# Patient Record
Sex: FEMALE | Race: OTHER | Employment: PART TIME | URBAN - METROPOLITAN AREA
[De-identification: names, ages, dates, MRNs, and addresses within clinical notes are randomized per-mention and may not be internally consistent; named-entity substitution may affect disease eponyms.]

---

## 2021-09-24 ENCOUNTER — OFFICE VISIT (OUTPATIENT)
Dept: SURGERY | Age: 36
End: 2021-09-24

## 2021-09-24 VITALS
OXYGEN SATURATION: 100 % | DIASTOLIC BLOOD PRESSURE: 73 MMHG | HEIGHT: 64 IN | WEIGHT: 293 LBS | HEART RATE: 60 BPM | TEMPERATURE: 97.7 F | SYSTOLIC BLOOD PRESSURE: 149 MMHG | BODY MASS INDEX: 50.02 KG/M2

## 2021-09-24 DIAGNOSIS — G47.30 SLEEP DISORDER BREATHING: Primary | ICD-10-CM

## 2021-09-24 PROCEDURE — 99205 OFFICE O/P NEW HI 60 MIN: CPT | Performed by: SPECIALIST

## 2021-09-24 RX ORDER — PRAVASTATIN SODIUM 20 MG/1
TABLET ORAL
COMMUNITY
Start: 2021-08-25

## 2021-09-24 RX ORDER — AMLODIPINE BESYLATE 10 MG/1
TABLET ORAL
COMMUNITY
Start: 2021-08-23

## 2021-09-24 RX ORDER — SERTRALINE HYDROCHLORIDE 25 MG/1
TABLET, FILM COATED ORAL
COMMUNITY
Start: 2021-07-13

## 2021-09-24 RX ORDER — ERGOCALCIFEROL 1.25 MG/1
CAPSULE ORAL
COMMUNITY
Start: 2021-08-25

## 2021-09-24 NOTE — PROGRESS NOTES
Bariatric Surgery Consultation    Subjective: The patient is a 39 y.o. obese female with a Body mass index is 64.68 kg/m². .  The patient is at her heaviest weight for the past 20 years. she has been overweight since a child. she has been considering surgery since this year. she desires surgery at this time because of multiple health concerns and their lifestyle issues which are hindered by their weight. she has been referred by his family physician Dr Alexander Varghese for evaluation and treatment of their obesity via surgical intervention. Viktoriya Fish has tried multiple diets in her lifetime   most recently tried physician supervised, behavior modification, unsupervised diets and Atkins    Bariatric comorbidities present are   Patient Active Problem List   Diagnosis Code    Morbid obesity ([facility] [address] E66.01    Morbid obesity with BMI of 60.0-69.9, adult ([facility] [address] E66.01, Z68.44    Gestational diabetes O24.419    Hypertension I10    Sleep disorder breathing G47.30    Arthritic-like pain M25.50    Hypercholesterolemia E78.00       The patient is considering laparoscopic gastric bypass surgery for surgical weight loss due to their ineffective progress with medical forms of weight loss and the urging of their physicians   who care for their primary medical issues. The patient  now presents  for consideration for weight loss surgery understanding the benefits of this over a medical approach of weight loss as   was discussed in our presentation on weight loss surgery. They have discussed their plans both with their family and primary care physician who is in support of their pursuit of such. The patient   has not had health issues as of late and denies and gastrointestinal disturbances other than what is outlined below in their review of symptoms.  All of their prior evaluations available by both their   PCP's and specialists physicians have been reviewed today either in the Care Everywhere portal or scanned under the media tab. I have spent a large portion of my initial consultation today reviewing the patients current dietary habits which have contributed to their health issues and obesity. I have suggested to them personally a dietary regimen that they can initiate now to help with their status as it pertains to their weight. They understand that the most important aspect of their   journey through their weight loss endeavor will be their adherence to a new lifestyle of healthy eating behavior. They also understand that an adherence to an exercise program will not only   help with weight loss but is ultimately important in weight maintenance. The patients goal weight is 195lb. These goals are consistent with expected outcomes of their desired operation. her Medical goals are resolution of these health issues.       Patient Active Problem List    Diagnosis Date Noted    Morbid obesity ([facility] [address]     Morbid obesity with BMI of 60.0-69.9, adult ([facility] [address]     Gestational diabetes     Hypertension     Sleep disorder breathing     Arthritic-like pain     Hypercholesterolemia       Past Surgical History:   Procedure Laterality Date    HX GYN      c section x 4      Social History     Tobacco Use    Smoking status: Never Smoker    Smokeless tobacco: Never Used   Substance Use Topics    Alcohol use: Yes     Comment: daily seltzer      Family History   Problem Relation Age of Onset    Hypertension Mother     Hypertension Father     Elevated Lipids Father       Current Outpatient Medications   Medication Sig Dispense Refill    ergocalciferol (ERGOCALCIFEROL) 1,250 mcg (50,000 unit) capsule       amLODIPine (NORVASC) 10 mg tablet       sertraline (ZOLOFT) 25 mg tablet       pravastatin (PRAVACHOL) 20 mg tablet        No Known Allergies       Review of Systems:            General - No history or complaints of unexpected fever, chills, or weight loss  Head/Neck - No history or complaints of headache, diplopia, dysphagia, hearing loss  Cardiac - No history or complaints of chest pain, palpitations, murmur, or shortness of breath  Pulmonary - No history or complaints of shortness of breath, productive cough, hemoptysis  Gastrointestinal - minimal reflux ,no  abdominal pain, obstipation/constipation or blood per rectum  Genitourinary - No history or complaints of hematuria/dysuria, stress urinary incontinence symptoms, or renal lithiasis  Musculoskeletal - moderate joint pain,  no muscular weakness  Hematologic - No history or complaints of bleeding disorders,  No blood transfusions  Neurologic - No history or complaints of  migraine headaches, seizure activity, syncopal episodes, TIA or stroke  Integumentary - No history or complaints of rashes, abnormal nevi, skin cancer  Gynecological - no menses           Objective:     Visit Vitals  BP (!) 149/73   Pulse 60   Temp 97.7 °F (36.5 °C)   Ht 5' 4\" (1.626 m)   Wt (!) 170.9 kg (376 lb 12.8 oz)   SpO2 100%   BMI 64.68 kg/m²       Physical Examination: General appearance - alert, well appearing, and in no distress and oriented to person, place, and time  Mental status - alert, oriented to person, place, and time, normal mood, behavior, speech, dress, motor activity, and thought processes  Eyes - pupils equal and reactive, extraocular eye movements intact, sclera anicteric, left eye normal, right eye normal  Ears - right ear normal, left ear normal  Nose - normal and patent, no erythema, discharge or polyps  Mouth - mucous membranes moist, pharynx normal without lesions  Neck - supple, no significant adenopathy  Lymphatics - no palpable lymphadenopathy, no hepatosplenomegaly  Chest - clear to auscultation, no wheezes, rales or rhonchi, symmetric air entry  Heart - normal rate, regular rhythm, normal S1, S2, no murmurs, rubs, clicks or gallops  Abdomen - soft, nontender, nondistended, no masses or organomegaly , incisional hernia noted just above umbilicus.   Back exam - full range of motion, no tenderness, palpable spasm or pain on motion  Neurological - alert, oriented, normal speech, no focal findings or movement disorder noted  Musculoskeletal - no joint tenderness, deformity or swelling  Extremities - peripheral pulses normal, no pedal edema, no clubbing or cyanosis  Skin - normal coloration and turgor, no rashes, no suspicious skin lesions noted    Labs:     No results found for: WBC, WBCLT, HGBPOC, HGB, HGBP, HCTPOC, HCT, PHCT, RBCH, PLT, MCV, HGBEXT, HCTEXT, PLTEXT  No results found for: NA, K, CL, CO2, AGAP, GLU, BUN, CREA, BUCR, GFRAA, GFRNA, CA, TBIL, TBILI, AP, TP, ALB, GLOB, AGRAT, ALT  No results found for: IRON, FE, TIBC, IBCT, PSAT, FERR  No results found for: FOL, RBCF  No results found for: VITD3, XQVID2, XQVID3, XQVID, VD3RIA              Assessment:     Morbid obesity with associated comorbidity    Plan:     laparoscopic gastric bypass surgery    This is a 39 y.o. female with a BMI of Body mass index is 64.68 kg/m². and the weight-related co-morbidties . Joyce meets the NIH criteria for bariatric surgery based upon the BMI of Body mass index is 64.68 kg/m². and   multiple weight-related co-morbidties. Joyce has elected laparoscopic gastric bypass as her intervention of choice for treatment of morbid obestiy through surgical means secondary   to its uniform results,  profound baseline suppression of hunger and pace at which weight is lost.    In the office today, following Joyce's history and physical examination, a 30 minute discussion regarding the anatomic alterations for the laparoscopic gastric bypass  was undertaken. The dietary expectations and the patient  dependent factors for success were thoroughly discussed, to include the need for interval follow-up and long-term dietary changes associated with success.    The possible short and long term  complications of the gastric bypass were also discussed, to include but not limited to;death, DVT/PE, staple line leak, bleeding, stricture formation, infection,internal   hernia  and pouch dilation. Specific weight related outcomes for success were also discussed with an emphasis on careful and close follow-up with the first year and dietary behavior modification over   the first years as baseline cyclical hunger returns  The patient expressed an understanding of the above factors, and her questions were answered in their entirety. In addition, the patient watched a seminar regarding obesity, surgical weight loss including, adjustable gastric band, gastric bypass, and sleeve gastrectomy. This discussion contrasted the different surgical techniques, mechanisms of actions and expected outcomes, and surgical and medical risks associated with each procedure. In office today we had a long question and answer session where each questions was answered until there were no additional questions. Today, the patient had all of her questions answered and desires to proceed with  bariatric surgery initially choosing the gastric bypass as her surgical option. Secondary Diagnoses:     Dietary Intervention  - The patient is currently scheduled to see or has been followed by a bariatric nutritionist for an attempt at preoperative weight loss as has been dictated by their insurance carrier. They will be assessed at various times during their follow up to evaluate their progress depending on the length of time that is required once again by their carrier. I have explained the importance of   preoperative weight loss and the benefits regarding lower surgical risk and also assisting the patient in reaching their weight loss goal. They understand also that they should participate in an  exercise program to assist in this weight loss. Finally they understand there is a physiologic benefit from the standpoint of hepatic volume reduction and reduction of central visceral adiposity   preoperatively.   I have reiterated the importance of a low carbohydrate and high protein regimen to achieve their   stated goal. I have reviewed their current eating behavior prior to this encounter and explained to them in an exhaustive fashion the appropriate diet that they should adhere to. They have been   encouraged to loose weight pre operatively and understand it is our prerogative to cancel surgery or postpone their procedure in the event of significant weight gain. The patients weight loss goal pre operatively is 20 pounds. Hypertension - The patient has a clear understanding of how weight loss improves hypertension as a whole, but also they understand that there is a significant genetic component   to this disease process. We will monitor the patients blood pressure while in the hospital and the plan would be to continue those medications postoperatively. If a diuretic is being   used we will stop them on discharge to prevent dehydration particularly with the sleeve gastrectomy and the gastric bypass procedures. They will be instructed to monitor their blood   pressure postoperatively while at home and notify their primary care physician in the event of any significantly high or uncharacteristic readings. They understand that surgery may be  cancelled if their blood pressure is deemed out of control the day of surgery either by myself or the anesthesia department. For this reason they must take their medications as directed   and monitor their blood pressure regularly working up until their surgical date. Hyperlipidemia - The patient understands that studies show that almost all patient will realize an improvement in their lipid profile with weight loss that occurs with these procedures. They however also understand that hyperlipidemia is a multifactorial disease particularly as it pertains to their genetic background and that there is no guarantee toward cure  of this   issue.  We will resume their medications both pre operatively and immediately postoperatively as this tends to decrease any post operative cardiac events. The patient will follow up   with their family physician in the postoperative period with plans to repeat their lipid panel 2-3 month postoperative for potential adjustment or removal of these medications. Obstructive Sleep Apnea -The patient understands the association of sleep apnea and obesity and the additional risk that it caries related to post surgical complications. If they have not been tested for sleep apnea and I feel they are at increased risk for this diagnosis, then they will be scheduled for a consultation with a Pulmonologist for such. In the event that they suzie this diagnosis we will have the patient bring their CPAP machine to the hospital for use both postoperatively in the PACU and on the floor at its appropriate setting.  We will have them continue using it while at home after surgery and follow up with their pulmonologist 6 months after to be retested to see if it can be discontinued at that time period. Will send for sleep study.         Signed By: Alejandro Villa MD     September 24, 2021

## 2021-12-22 ENCOUNTER — APPOINTMENT (OUTPATIENT)
Dept: SURGERY | Age: 36
End: 2021-12-22

## 2021-12-22 ENCOUNTER — HOSPITAL ENCOUNTER (OUTPATIENT)
Age: 36
Setting detail: OUTPATIENT SURGERY
Discharge: HOME | End: 2021-12-22
Attending: SPECIALIST | Admitting: SPECIALIST
Payer: MEDICAID

## 2021-12-22 ENCOUNTER — APPOINTMENT (OUTPATIENT)
Dept: GENERAL RADIOLOGY | Age: 36
End: 2021-12-22
Attending: SPECIALIST
Payer: MEDICAID

## 2021-12-22 ENCOUNTER — OFFICE VISIT (OUTPATIENT)
Dept: SURGERY | Age: 36
End: 2021-12-22

## 2021-12-22 VITALS
DIASTOLIC BLOOD PRESSURE: 93 MMHG | BODY MASS INDEX: 51.91 KG/M2 | HEIGHT: 63 IN | TEMPERATURE: 98 F | OXYGEN SATURATION: 100 % | RESPIRATION RATE: 22 BRPM | HEART RATE: 58 BPM | SYSTOLIC BLOOD PRESSURE: 160 MMHG | WEIGHT: 293 LBS

## 2021-12-22 DIAGNOSIS — E66.01 MORBID OBESITY (HCC): Primary | ICD-10-CM

## 2021-12-22 DIAGNOSIS — K21.9 GASTROESOPHAGEAL REFLUX DISEASE WITHOUT ESOPHAGITIS: ICD-10-CM

## 2021-12-22 DIAGNOSIS — E66.01 MORBID OBESITY (HCC): ICD-10-CM

## 2021-12-22 PROCEDURE — 74240 X-RAY XM UPR GI TRC 1CNTRST: CPT | Performed by: SPECIALIST

## 2021-12-22 PROCEDURE — 74240 X-RAY XM UPR GI TRC 1CNTRST: CPT

## 2021-12-22 PROCEDURE — 76040000019: Performed by: SPECIALIST

## 2021-12-22 PROCEDURE — 74011000250 HC RX REV CODE- 250: Performed by: SPECIALIST

## 2021-12-22 NOTE — PROCEDURES
Patient:Latrice Jearlean Cogan   : 1985  Medical Record QRLBTE:[id number]            PREPROCEDURE DIAGNOSIS: This patient is preoperative for laparoscopic gastric bypass surgery procedure with a history of  reflux disease. POSTPROCEDURE DIAGNOSIS: This patient is preoperative for laparoscopic gastric bypass surgery procedure with a history of  reflux disease. PROCEDURES PERFORMED: Upper GI study with barium. ESTIMATED BLOOD LOSS: None. SPECIMENS: None. STATEMENT OF MEDICAL NECESSITY: The patient is a patient with a  longstanding history of obesity. They are now considering the laparoscopic gastric bypass surgery procedure as a means of surgical weight control and due to their history of reflux disease and are being assessed preoperatively for such. DESCRIPTION OF PROCEDURE: The patient was brought to the fluoroscopy unit and  was given thin barium. On swallowing of barium, they were noted to have  normal peristalsis of their esophagus. They had prompt filling of distal  esophagus with tapering into the gastroesophageal junction. There was no evidence of a hiatal hernia present. Contrast then filled the gastric cardia, fundus,body and pre pyloric region with no abnormalities noted. Contrast then exited the pylorus in normal fashion. No obstruction was noted. There was no evidence of reflux noted.     (normal anatomy)    Brown Cho MD

## 2021-12-30 VITALS — WEIGHT: 293 LBS | HEIGHT: 64 IN | BODY MASS INDEX: 50.02 KG/M2

## 2021-12-31 NOTE — PROGRESS NOTES
[facility]  Multidisciplinary Weight Loss    Date: 2021    Name: Margot Rabago   : 1985    Session# 1. Pt attended a 90 minute in-person class. Topics reviewed include pre-op key diet principles, behavior modification techniques, and physical activity/exercise guidelines both pre- and post-operatively. Pt completed a bariatric-specific questionnaire, including a diet history, physical activity summary, and responses to additional nutrition-related questions. Pt submitted bariatric quiz, which requires at least an 80% pass-rate for surgical approval from RD. Weight obtained in office. Visit Vitals  Ht 5' 4\" (1.626 m)   Wt (!) 176.2 kg (388 lb 6.4 oz)   BMI 66.67 kg/m²       Pt has GAINED 11.6 lbs since initial consult on 21. Pt has a pre-op weight loss goal of 20 lbs set by the provider. Dietary Instructions     Reviewed intake   Understanding low carbohydrates, low sugar, higher protein meals   Instruction given for personal dietary changes   Discussed perceived compliance    Comments:  Recommended dietary changes discussed for both before and after surgery. Recommendation to follow 1200 calorie diet, working to reduce total carbohydrate intake to  g or less per day and increasing protein intake to  g per day, with no liquids at mealtimes (for preparation of post-op 30:30 rule recommendation), and 64+ oz  no sugar, no caffeine, no carbonation fluids per day. Discussed importance of sampling protein supplements, protein supplement label guidelines and popularly selected items. Educated pt on the importance of eating 3 meals/day at regularly scheduled times, including breakfast within the first 1-2 hours of waking, as well as working towards consuming 4-6 smaller meals daily to assist with optimizing protein intake and absorption.  Suggested patient use a protein supplement as a meal replacement, instead of skipping meals, OR as a high-protein snack between meals. Also provided education on the importance of including a protein source with every meal and snack, and reviewed lean sources of proteins. Introduced the Plate Method for meal planning and discussed the importance of reducing and limiting daily carbohydrate intake. Recommended carbohydrate intake amounts are  g/d now with an ultimate goal of 30-50 g/d pre-operatively and a very low carbohydrate content post-operatively. Discussed label reading, tips for measuring carbohydrates, and low carbohydrate- high protein meal and snack ideas. Behavior Modification     Identify obstacles to trigger change   Achieving/Rewarding goals met   Positive attitude   Reinforced the importance of modifying lifestyle patterns and behaviors to promote weight loss and long-term weight maintenance    Comments:   Discussed key diet principles. Reviewed dietary and behavior changes to begin making now. Discussed the importance of taking vitamin/mineral supplements post-op, and we reviewed the vitamins/minerals that patients will be taking post-op. Encouraged pt to begin taking multivitamin and probiotic now in preparation for surgery. RD reviewed questionnaire responses and provided positive feedback on responses that align with bariatric recommendations for behavior changes. Also provided feedback on areas for improvement. All current stated pt questions answered. Physical Activity/Exercise     Motivation   Discussed Perceived Compliance    Comments:  Pt has been educated on the importance of starting a physical activity regimen. Reinforced the importance of engaging in regular physical activity for weight management and overall health. Recommend pt adopt an exercise routine of at least 3-5 d/wk for at least 30 min/d.   If pt is unable to participate in moderate intensity exercises such as walking, theresa, or swimming, recommend pt work with a physical therapist and/or participate in chair exercises, yoga, and/or increase activities of daily living as able. Encouraged pt to set and keep weekly exercise goals. Goals:   1. Work to increase to 3-4 small meals per day, with planned snacks as needed. Recommend following plate method for meal planning - focusing on lean protein, non-starchy vegetables, and measured amounts of starch. - Goal of  g protein and  g carbohydrate per day. - Recommend continuing protein supplement as meal replacement at least 1x/day OR as high protein snack option  2. Increase non caloric fluid to 64 oz per day. Eliminate caffeine, added sugar, carbonation, and straws.               -Continue to work to decrease sugar sweetened beverages - goal of calorie free beverages only              -Must eliminate caffeine prior to surgery and avoid for ~6-8 weeks   -Practice 30:30 rule,  food and fluid intake   3. Start activity regimen, work to increase ADL  4. Start Complete MVI    Candidate for surgery (per RD): Patient has met insurance requirements; however, I recommend that they return to clinic in 1 month for weight check since pt has gained 11.6 lbs since their initial consult on 9/24/21. This will give pt time to put the nutritional goals set today for weight loss into practice. Pt acknowledges understanding that bariatric surgery requires lifelong adherence to dietary and exercise behavior change recommendations in order to be successful with weight loss and weight-loss maintenance. Pt demonstrates understanding of post-op key diet principles and recommendations through class discussion and recall. Handouts provided:    Bariatric Surgery Criteria\" Packet   Overview of Vitamins & Minerals     Post-Op Education\" Packet   Meal Guide Packet   BD Starches Guide   Nuts for Nuts? Be Careful!    Protein Chart   Protein Content of Foods   Suzanne Products Counting\"   \"Snack Suggestions\"    RD contact information provided for future nutrition questions or concerns. [x] E-mail sent to pt with monthly support group information.     Shelly Nuñez RD

## 2022-02-02 ENCOUNTER — CLINICAL SUPPORT (OUTPATIENT)
Dept: SURGERY | Age: 37
End: 2022-02-02

## 2022-02-02 VITALS — BODY MASS INDEX: 51.91 KG/M2 | HEIGHT: 63 IN | WEIGHT: 293 LBS

## 2022-02-02 DIAGNOSIS — E66.01 MORBID OBESITY (HCC): Primary | ICD-10-CM

## 2022-02-02 NOTE — PROGRESS NOTES
Patient's Name: Zabrina Delaney   Age: 39 y.o. YOB: 1985   Sex: female    Date:  2/2/2022      Visit 2      Visit Vitals  Ht 5' 3\" (1.6 m)   Wt (!) 174.9 kg (385 lb 8 oz)   BMI 68.29 kg/m²       Pounds Lost since last month: 2.9 lbs. Pounds Gained since last month: 0 lbs. Starting Weight: 376.8 lbs. Previous Months Weight: 388.4 lbs. Overall Pounds Lost: 0 lbs. Overall Pounds Gained: 8.7 lbs. Do you smoke? NO    Alcohol intake? YES  Number of drinks at a time:  16 oz cocktail  Number of times a week: 4-5d/wk      Eating Habits and Behaviors    I reviewed Nutrition, Behavior, and Exercise changes to start working on. Some of the eating behaviors that we discussed included:  timing of meals, meal composition, macronutrient composition of various foods, the importance of adequate fluids and protein, and selecting appropriate foods for meals and snacks. We also talked about avoiding liquid calories, including alcohol. Patient is encouraged eliminate alcohol consumption and aim for 64 ounces of sugar-free, caffeine-free, and carbonation-free fluid per day, preferably from water, but also sugar-free beverages such as Crystal Light. Additionally, we discussed healthier options for dining out. Patient was encouraged to always request sauces and dressings on the side and request a salad, broth-based soups, or non-starchy vegetables in place of fries.       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; reports she is not consistent with fluid intake: drinks more when not at work; set a family rule of \"no takeout\" for dinner during last 2 weeks but often eats fast food when working dayshift  5:30-6:00 AM - UP (6x/2wk)  7 am-3:30 pm - BREAKFAST: [employer] (breakfast meal - croissant w/ breaded chicken, cheese, powell and hash fries, gerald lemonade no ice) OR snacks throughout 8 hr shift on nuts, granola bars, PB/cheese crackers  1:00 pm - LUNCH: \"whatever I find in the house\" - leftovers OR TV dinner (john steak, mashed potatoes)  6:00-7:00 PM - DINNER: turkey wings, rice OR stir leung (onion, peppers, steak) w/ rice OR breaded chicken in general La's sauce w/ fettuccine, broccoli  DESSERT: sherbet (2-3x/wk)  SNACKS: cheese and crackers, mangos, lettuce wrap w/ tuna fish or chicken salad, broccoli w/ ranch dip, cucumbers in vinegar  FLUIDS: water (5-6 bottles/d if not at work or 4 bottles when working + >32 oz jug of water w/ Crystal light at work)    Protein supplement intake: drinks Boost at work    Physical Activity/Exercise    Comments: We talked about exercise. Patient was given reasons of why exercise is so important and how that can help with their long-term success. Discussed goal of 150 minutes of vigorous activity/wk and to include purposeful activity, such as parking far in the grocery store lot, etc. I have encouraged patient to get a support system to help with the activity. Currently for activity, patient is: has membership at [employer], not currently using; also has stationary bike at home      Behavior Modification       Comments: We also talked about behavior modifications. We talked about eating triggers, such as eating in front of the TV and solutions, such as making the TV a no eating zone. If patient is eating out of emotion, food will only temporarily solve that. Patient is encouraged to HALT and assess if they are eating because they are Hungry, or out of emotions: Anxious, Lonely, Tired, which the food will only temporarily solve. We also talked about ways to prevent relapse. Goals that patient wants to work on include:  1. Eat first protein-forward meal within 2 hours of waking and eat a meal or high protein snack every 3-4 hrs while awake.   2.  Patient will find 2 protein supplements from approved list that they will drink post-operatively and use as a meal replacement or high protein snack up to 2x/d to facilitate pre-op weight loss. 3.  Patient will reduce carbohydrate intake to <100 g/d in the next month  4. Patient will eliminate alcohol intake and consumption of sugary beverages by next appointment, ensuring intake of 64 oz water or other sugar-free/carbonation-free/caffeine-free fluids daily  5. Patient willwork on sipping fluids between meals rather than \"chugging\" large quantities of water at meals  6. Aim for 90 min/wk of moderate physical activity with ultimate goal of 150 min/wk physical activity by the next appt. Handouts Provided:     [] Bariatric Surgery Criteria Packet  [] \"Nut for Nuts? Be careful! \"  [] Meal Guide Handout  [] Carb Counting  [] Protein content of Foods  [] Snack Suggestions  [] BD Starches Packet  [] Protein Chart  [] Post-op Education Packet  [] Overview of Vitamins & Minerals TIMELINE  [x] Other \"Preparing for Weight Loss Surgery:  <100 g/d Carbohydrate Eating Plan\"; \"Meal Goals & Guidelines\"; \"Protein Supplement\" List  [] None    Follow-up: Pt scheduled for nutrition education on 3/2/22. Pt was given RD contact information for nutrition-related questions. All current questions answered. Alex Holden RD  2/2/2022                              . Inga Archer

## 2024-09-04 ENCOUNTER — TELEMEDICINE (OUTPATIENT)
Age: 39
End: 2024-09-04
Payer: MEDICAID

## 2024-09-04 DIAGNOSIS — M25.50 ARTHRALGIA, UNSPECIFIED JOINT: ICD-10-CM

## 2024-09-04 DIAGNOSIS — E66.01 MORBID OBESITY (HCC): ICD-10-CM

## 2024-09-04 DIAGNOSIS — K43.2 INCISIONAL HERNIA, WITHOUT OBSTRUCTION OR GANGRENE: ICD-10-CM

## 2024-09-04 DIAGNOSIS — E66.01 MORBID OBESITY WITH BMI OF 60.0-69.9, ADULT (HCC): ICD-10-CM

## 2024-09-04 DIAGNOSIS — I10 HYPERTENSION, UNSPECIFIED TYPE: Primary | ICD-10-CM

## 2024-09-04 DIAGNOSIS — E78.00 HYPERCHOLESTEROLEMIA: ICD-10-CM

## 2024-09-04 PROCEDURE — 99205 OFFICE O/P NEW HI 60 MIN: CPT | Performed by: SPECIALIST

## 2024-09-04 RX ORDER — PHENTERMINE HYDROCHLORIDE 37.5 MG/1
37.5 TABLET ORAL
COMMUNITY

## 2024-09-04 RX ORDER — SPIRONOLACTONE 50 MG/1
50 TABLET, FILM COATED ORAL DAILY
COMMUNITY

## 2024-09-04 RX ORDER — DROSPIRENONE AND ETHINYL ESTRADIOL 0.02-3(28)
1 KIT ORAL DAILY
COMMUNITY

## 2024-09-04 RX ORDER — BUPROPION HYDROCHLORIDE 300 MG/1
300 TABLET ORAL EVERY MORNING
COMMUNITY

## 2024-09-04 NOTE — PROGRESS NOTES
contrasted the different surgical techniques, mechanisms of actions and expected outcomes, and surgical and medical risks associated with each procedure.     Today, the patient had all of her questions answered and desires to proceed with  bariatric surgery initially choosing the gastric bypass as her surgical option.    Secondary Diagnoses:     Dietary Intervention  - The patient is currently scheduled to see or has been followed by a bariatric nutritionist for an attempt at preoperative weight loss as has been dictated by their insurance carrier.  They will be assessed at various times during their follow up to evaluate their progress depending on the length of time that is required once again by their carrier.  I have explained the importance of preoperative weight loss and the benefits regarding lower surgical risk and also assisting the patient in reaching their weight loss goal.  Finally they understand their is a physiologic benefit from the standpoint of hepatic volume reduction preoperatively.  I have reiterated the importance of a low carbohydrate and high protein regimen to achieve their stated goal.       Hypertension - the patient understands, and we have discussed in detail, that this is an active acute health problem that has a multifactorial effect on their body from the standpoint of healing and complications related to surgery.  They understand that uncontrolled hypertension affects other organ processes and this leads to risk associated with surgical procedures.  They understand that in addition to hypertension, once they develop other health issues, their risk is exponentially worse.  Currently they understand that this likely one of the single most important items that they need to control in the perioperative process.  They understand that the hospitals protocol is that patients entering the operative suite should have a blood pressure reading less than 160/90 prior to surgery.  Elevated

## 2024-10-04 ENCOUNTER — OFFICE VISIT (OUTPATIENT)
Age: 39
End: 2024-10-04
Payer: MEDICAID

## 2024-10-04 VITALS
RESPIRATION RATE: 16 BRPM | OXYGEN SATURATION: 100 % | WEIGHT: 293 LBS | TEMPERATURE: 98.3 F | DIASTOLIC BLOOD PRESSURE: 87 MMHG | HEIGHT: 65 IN | HEART RATE: 67 BPM | SYSTOLIC BLOOD PRESSURE: 132 MMHG | BODY MASS INDEX: 48.82 KG/M2

## 2024-10-04 DIAGNOSIS — E28.2 PCOS (POLYCYSTIC OVARIAN SYNDROME): ICD-10-CM

## 2024-10-04 DIAGNOSIS — E66.01 MORBID OBESITY WITH BODY MASS INDEX (BMI) OF 50.0 TO 59.9 IN ADULT: ICD-10-CM

## 2024-10-04 DIAGNOSIS — I10 HYPERTENSION, UNSPECIFIED TYPE: ICD-10-CM

## 2024-10-04 DIAGNOSIS — E78.00 HYPERCHOLESTEROLEMIA: ICD-10-CM

## 2024-10-04 DIAGNOSIS — M25.50 ARTHRALGIA, UNSPECIFIED JOINT: ICD-10-CM

## 2024-10-04 DIAGNOSIS — K43.2 INCISIONAL HERNIA, WITHOUT OBSTRUCTION OR GANGRENE: ICD-10-CM

## 2024-10-04 DIAGNOSIS — F41.9 ANXIETY: ICD-10-CM

## 2024-10-04 DIAGNOSIS — G47.30 SLEEP DISORDER BREATHING: ICD-10-CM

## 2024-10-04 DIAGNOSIS — E66.01 MORBID OBESITY: ICD-10-CM

## 2024-10-04 DIAGNOSIS — E66.01 MORBID OBESITY WITH BODY MASS INDEX (BMI) OF 50.0 TO 59.9 IN ADULT: Primary | ICD-10-CM

## 2024-10-04 DIAGNOSIS — F32.A DEPRESSION, UNSPECIFIED DEPRESSION TYPE: ICD-10-CM

## 2024-10-04 DIAGNOSIS — Z01.818 PRE-OP TESTING: ICD-10-CM

## 2024-10-04 PROCEDURE — 99214 OFFICE O/P EST MOD 30 MIN: CPT | Performed by: SPECIALIST

## 2024-10-04 PROCEDURE — 3075F SYST BP GE 130 - 139MM HG: CPT | Performed by: SPECIALIST

## 2024-10-04 PROCEDURE — 3079F DIAST BP 80-89 MM HG: CPT | Performed by: SPECIALIST

## 2024-10-04 NOTE — PROGRESS NOTES
extraocular eye movements intact  Ears - external ear canals normal  Nose - normal and patent, no erythema, discharge or polyps  Mouth - mucous membranes moist, pharynx normal without lesions  Neck - supple, no significant adenopathy  Lymphatics - no palpable lymphadenopathy, no hepatosplenomegaly  Chest - clear to auscultation, no wheezes, rales or rhonchi, symmetric air entry  Heart - normal rate, regular rhythm, normal S1, S2, no murmurs, rubs, clicks or gallops  Abdomen - chronically incarcerated periumbilical hernia at vertical midline incision  Back exam - full range of motion, no tenderness, palpable spasm or pain on motion  Neurological - alert, oriented, normal speech, no focal findings or movement disorder noted  Musculoskeletal - no joint tenderness, deformity or swelling  Extremities - peripheral pulses normal, no pedal edema, no clubbing or cyanosis  Skin - normal coloration and turgor, no rashes, no suspicious skin lesions noted    Labs:             Assessment:     Morbid obesity with associated comorbidity     Plan:     Continuation of Pre-Operative evaluation / clearance.    Lily Ortiz has returned to the office today to discuss her status as a surgical candidate.  her progress has been noted and reviewed.  We will continue the pre-operative process and work towards goals as outlined.    she has 15 more pounds to lose before proceeding to the OR.      Lily Ortiz understand the rationales for all the above.  It has been discussed that given her chronically incarcerated hernia that the best surgical option for this patient would be the sleeve resection.  Lily Ortiz agrees with the surgical choice and has been educated in it's; risks, benefits, and alternatives.  We will continue with the pre-operative evaluation as needed to check progress.    The patient understands the plan of action    Dr. Dudley well aware of pt's exam, plan, and weight loss goal    Secondary Diagnoses:

## 2024-10-07 NOTE — PATIENT INSTRUCTIONS
increase your chances of quitting for good.  Limit alcohol to 2 drinks a day for men and 1 drink a day for women. Too much alcohol can cause health problems.  If you have a BMI higher than 25  Your doctor may do other tests to check your risk for weight-related health problems. This may include measuring the distance around your waist. A waist measurement of more than 40 inches in men or 35 inches in women can increase the risk of weight-related health problems.  Talk with your doctor about steps you can take to stay healthy or improve your health. You may need to make lifestyle changes to lose weight and stay healthy, such as changing your diet and getting regular exercise.  If you have a BMI lower than 18.5  Your doctor may do other tests to check your risk for health problems.  Talk with your doctor about steps you can take to stay healthy or improve your health. You may need to make lifestyle changes to gain or maintain weight and stay healthy, such as getting more healthy foods in your diet and doing exercises to build muscle.  Where can you learn more?  Go to http://[email or web address]net/[employer].  Enter S176 in the search box to learn more about \"Body Mass Index: Care Instructions.\"  Current as of: June 26, 2018  Content Version: 11.8  © 8013-3061 [employer]. Care instructions adapted under license by [employer] (which disclaims liability or warranty for this information). If you have questions about a medical condition or this instruction, always ask your healthcare professional. [employer] disclaims any warranty or liability for your use of this information.

## 2024-10-09 ENCOUNTER — HOSPITAL ENCOUNTER (OUTPATIENT)
Facility: HOSPITAL | Age: 39
Setting detail: SPECIMEN
Discharge: HOME | End: 2024-10-12

## 2024-10-09 LAB — LABCORP SPECIMEN COLLECTION: NORMAL

## 2024-10-09 PROCEDURE — 99001 SPECIMEN HANDLING PT-LAB: CPT

## 2024-10-10 LAB
ALBUMIN SERPL-MCNC: 4.5 G/DL (ref 3.9–4.9)
ALP SERPL-CCNC: 68 IU/L (ref 44–121)
ALT SERPL-CCNC: 18 IU/L (ref 0–32)
AST SERPL-CCNC: 19 IU/L (ref 0–40)
BASOPHILS # BLD AUTO: 0 X10E3/UL (ref 0–0.2)
BASOPHILS NFR BLD AUTO: 0 %
BILIRUB SERPL-MCNC: 0.7 MG/DL (ref 0–1.2)
BUN SERPL-MCNC: 14 MG/DL (ref 6–20)
BUN/CREAT SERPL: 14 (ref 9–23)
CALCIUM SERPL-MCNC: 10 MG/DL (ref 8.7–10.2)
CHLORIDE SERPL-SCNC: 101 MMOL/L (ref 96–106)
CO2 SERPL-SCNC: 20 MMOL/L (ref 20–29)
CREAT SERPL-MCNC: 1.03 MG/DL (ref 0.57–1)
EGFRCR SERPLBLD CKD-EPI 2021: 71 ML/MIN/1.73
EOSINOPHIL # BLD AUTO: 0.1 X10E3/UL (ref 0–0.4)
EOSINOPHIL NFR BLD AUTO: 1 %
ERYTHROCYTE [DISTWIDTH] IN BLOOD BY AUTOMATED COUNT: 12.5 % (ref 11.7–15.4)
FERRITIN SERPL-MCNC: 106 NG/ML (ref 15–150)
GLOBULIN SER CALC-MCNC: 3.4 G/DL (ref 1.5–4.5)
GLUCOSE SERPL-MCNC: 85 MG/DL (ref 70–99)
HCT VFR BLD AUTO: 41.8 % (ref 34–46.6)
HGB BLD-MCNC: 13.8 G/DL (ref 11.1–15.9)
IMM GRANULOCYTES # BLD AUTO: 0 X10E3/UL (ref 0–0.1)
IMM GRANULOCYTES NFR BLD AUTO: 0 %
IRON SERPL-MCNC: 66 UG/DL (ref 27–159)
LYMPHOCYTES # BLD AUTO: 3.2 X10E3/UL (ref 0.7–3.1)
LYMPHOCYTES NFR BLD AUTO: 26 %
MCH RBC QN AUTO: 29.4 PG (ref 26.6–33)
MCHC RBC AUTO-ENTMCNC: 33 G/DL (ref 31.5–35.7)
MCV RBC AUTO: 89 FL (ref 79–97)
MONOCYTES # BLD AUTO: 0.7 X10E3/UL (ref 0.1–0.9)
MONOCYTES NFR BLD AUTO: 5 %
NEUTROPHILS # BLD AUTO: 8.4 X10E3/UL (ref 1.4–7)
NEUTROPHILS NFR BLD AUTO: 68 %
PLATELET # BLD AUTO: 261 X10E3/UL (ref 150–450)
POTASSIUM SERPL-SCNC: 4.2 MMOL/L (ref 3.5–5.2)
PROT SERPL-MCNC: 7.9 G/DL (ref 6–8.5)
RBC # BLD AUTO: 4.69 X10E6/UL (ref 3.77–5.28)
SODIUM SERPL-SCNC: 138 MMOL/L (ref 134–144)
T4 FREE SERPL-MCNC: 1.43 NG/DL (ref 0.82–1.77)
TSH SERPL DL<=0.005 MIU/L-ACNC: 1.98 UIU/ML (ref 0.45–4.5)
WBC # BLD AUTO: 12.5 X10E3/UL (ref 3.4–10.8)

## 2024-10-23 ENCOUNTER — HOSPITAL ENCOUNTER (OUTPATIENT)
Facility: HOSPITAL | Age: 39
Discharge: HOME | End: 2024-10-26

## 2024-10-23 ENCOUNTER — HOSPITAL ENCOUNTER (OUTPATIENT)
Facility: HOSPITAL | Age: 39
Setting detail: OUTPATIENT SURGERY
Discharge: HOME | End: 2024-10-26
Payer: MEDICAID

## 2024-10-23 ENCOUNTER — HOSPITAL ENCOUNTER (OUTPATIENT)
Facility: HOSPITAL | Age: 39
Discharge: HOME | End: 2024-10-26
Payer: MEDICAID

## 2024-10-23 VITALS — BODY MASS INDEX: 51.91 KG/M2 | WEIGHT: 293 LBS | HEIGHT: 63 IN

## 2024-10-23 VITALS
WEIGHT: 293 LBS | TEMPERATURE: 97.8 F | BODY MASS INDEX: 48.82 KG/M2 | SYSTOLIC BLOOD PRESSURE: 152 MMHG | HEIGHT: 65 IN | HEART RATE: 67 BPM | RESPIRATION RATE: 18 BRPM | DIASTOLIC BLOOD PRESSURE: 91 MMHG

## 2024-10-23 DIAGNOSIS — E66.01 MORBID OBESITY: ICD-10-CM

## 2024-10-23 PROCEDURE — 74240 X-RAY XM UPR GI TRC 1CNTRST: CPT | Performed by: SPECIALIST

## 2024-10-23 PROCEDURE — 74220 X-RAY XM ESOPHAGUS 1CNTRST: CPT

## 2024-10-23 PROCEDURE — 2500000003 HC RX 250 WO HCPCS: Performed by: SPECIALIST

## 2024-10-23 PROCEDURE — 74240 X-RAY XM UPR GI TRC 1CNTRST: CPT

## 2024-10-23 RX ADMIN — BARIUM SULFATE 100 ML: 960 POWDER, FOR SUSPENSION ORAL at 15:45

## 2024-10-23 NOTE — PROGRESS NOTES
[facility] UGI FOCUS NOTE      Date:  10/23/2024  Time:  3:38 PM    Patient:  Lily Ortiz  Procedure:  UGI      Patient Questions  Lap Band Adjustment Patient Questionnaire:  If female, are you pregnant? []Yes     [x]No  Tolerates thick liquids:  []Well   [x]1     []2     []3     []4     []5     []Poorly  Tolerates red meat:  []Well   []1     [x]2     []3     []4     []5     [] Poorly  Tolerates chicken:  [x]Well   []1     []2     []3     []4     []5     []Poorly  Tolerates fish:   [x]Well   []1     []2     []3     []4     []5     []Poorly  Hunger is:   []Well Controlled     [x]1     []2     []3     []4     []5      [] Poorly Controlled  Nightime regurgitation:  [x]Never     []1     []2     []3     []4     []5     []Frequently    Lap Band Info:  Band Type  []Realize     []Realize-C     []AP     []VG     []10cm     []Unknown  []Other      Comments:        Jenni Middleton RN

## 2024-10-23 NOTE — PROCEDURES
Patient:Lily Ortiz   : 1985  Medical Record Number:[id number]            PREPROCEDURE DIAGNOSIS: This patient is preoperative for laparoscopic gastric bypass surgery procedure with a history of  reflux disease.    POSTPROCEDURE DIAGNOSIS: This patient is preoperative for laparoscopic gastric bypass surgery procedure with a history of  reflux disease.      PROCEDURES PERFORMED: Upper GI study with barium.    ESTIMATED BLOOD LOSS: None.    SPECIMENS: None.    STATEMENT OF MEDICAL NECESSITY: The patient is a patient with a  longstanding history of obesity. They are now considering the laparoscopic gastric bypass surgery procedure as a means of surgical weight control and due to their history of reflux disease and are being assessed preoperatively for such.    DESCRIPTION OF PROCEDURE: The patient was brought to the fluoroscopy unit and  was given thin barium. On swallowing of barium, they were noted to have  normal peristalsis of their esophagus. They had prompt filling of distal  esophagus with tapering into the gastroesophageal junction. There was no evidence of a hiatal hernia present. Contrast then filled the gastric cardia, fundus,body and pre pyloric region with no abnormalities noted. Contrast then exited the pylorus in normal fashion. No obstruction was noted. There was no evidence of reflux noted.    (normal anatomy)    Yang Dudley MD

## 2024-10-23 NOTE — PROGRESS NOTES
Medical Weight Loss Multi-Disciplinary Program    Patient's Name: Lily Ortiz Age: 39 y.o.   YOB: 1985 Sex: female      Session #1. Pt attended in-person class.  Weight obtained in office.    Date: 10/23/2024    Vitals:    10/23/24 1622   Weight: (!) 159.4 kg (351 lb 8 oz)   Height: 1.6 m (5' 3\")      Body mass index is 62.27 kg/m².     Pounds Lost since last month: N/A   Pounds Gained since last month: N/A    Starting Weight: 349.5 lbs  Previous Month’s Weight: N/A  Overall Pounds Lost: 0.0 lbs  Overall Pounds Gained: 2 lbs      Class Guidelines    Guidelines are reviewed with patient at the start of every class.    1. Patient understands that weight loss trial classes must be consecutive.  Patient understands if they miss a class, it is their responsibility to contact me to reschedule class.  I will reach out to patient after their first no show.  2.  Patient understands the expectations that weight maintenance/weight loss is expected during the classes.  Failure to demonstrate changes may result in extension of weight loss trial, followed by returning to see the surgeon.  Patient understands that they CANNOT gain any weight during the weight loss trial.  Gaining weight will result in extension of weight loss trial.  3. Patient is also instructed to complete their labwork, psychological evaluation visit, and any other tests that the surgeon has used while they are working on their weight loss trial.  4.  Patient was instructed to bring their packet of nutrition education materials to every class and appointment.        Eating Habits and Behaviors    Today in class we reviewed the Key Diet Principles.  Patient was encouraged to consume 3 meals each day, and the timing the meals was reviewed.  Meal time behaviors that will help pt to be successful with their weight loss efforts were additionally reviewed.      We discussed the importance of drinking adequate amounts of fluids, recommending that

## 2024-11-14 ENCOUNTER — PREP FOR PROCEDURE (OUTPATIENT)
Age: 39
End: 2024-11-14

## 2024-11-14 ENCOUNTER — HOSPITAL ENCOUNTER (OUTPATIENT)
Facility: HOSPITAL | Age: 39
Discharge: HOME | End: 2024-11-17

## 2024-11-14 VITALS — WEIGHT: 293 LBS | BODY MASS INDEX: 51.91 KG/M2 | HEIGHT: 63 IN

## 2024-11-14 DIAGNOSIS — E66.01 MORBID OBESITY: ICD-10-CM

## 2024-11-14 DIAGNOSIS — Z01.812 PRE-OPERATIVE LABORATORY EXAMINATION: ICD-10-CM

## 2024-11-14 DIAGNOSIS — E66.01 MORBID OBESITY WITH BODY MASS INDEX (BMI) OF 50.0 TO 59.9 IN ADULT: ICD-10-CM

## 2024-11-14 DIAGNOSIS — I10 HYPERTENSION, UNSPECIFIED TYPE: ICD-10-CM

## 2024-11-14 DIAGNOSIS — E66.01 MORBID OBESITY: Primary | ICD-10-CM

## 2024-11-14 NOTE — PROGRESS NOTES
we reviewed the Key Diet Principles.  Patient was encouraged to consume 3 meals each day, and meal timing was reviewed.  Meal time behaviors that will help pt to be successful with their weight loss efforts were also discussed.      We discussed the importance of drinking adequate amounts of fluids, recommending that patient consume a minimum of 64 oz of sugar-free, caffeine-free and carbonation-free fluids each day.  Patient was encouraged to eliminate sugar-sweetened beverages such as sweet tea, fruit juice, fruit smoothies, flavored coffee drinks and regular sodas.      During the weight loss trial, patient is encouraged to focus on eating protein-forward meals, with a daily goal of  grams protein.  Patient was also advised to decrease carbohydrate intake to <100 g/d, choosing complex vs. simple carbohydrates in limited amounts.  We also discussed limiting fat intake.  Encouraged patient to follow the \"3-gram rule\" when choosing foods by looking for items containing <3 g of fat and sugar per serving.  We reviewed meal planning guidelines and discussed appropriate meal and snack options.  We also talked about appropriate protein drinks and patient was encouraged to start trying these, using them either for a meal replacement or a protein-rich snack pre-operatively.  We reviewed the nutritional guidelines for selecting protein shakes.      Pre-operative vitamin and mineral supplementation was reviewed.  Patient was instructed to choose a chewable complete multivitamin with iron in NON-gummy form. Selection of probiotic was also reviewed.    Comments: During today's class we talked about making healthy dietary choices during the holidays. Patient was instructed on:    Thoughtful and deliberate eating habits.  Patient was given the guidelines to keep their carbohydrates less than 50-75 grams per day in the pre-op phase.  Patient was also given ideas for substituting lower carbohydrate food options for typical

## 2024-12-05 ENCOUNTER — TELEPHONE (OUTPATIENT)
Age: 39
End: 2024-12-05

## 2024-12-05 NOTE — TELEPHONE ENCOUNTER
Patient was contacted to discuss some important pre-op information and education in preparation for surgery.      No answer/Mail box full      Diabetes:  Have you been testing your blood sugar levels at home, and what have they been the past few days?      If they are not significantly lower than 200 the entire month prior to surgery, you need to let us know and contact your family physician for an appointment.    GLP 1 Injectables:  Are you currently taking any injectables for diabetes or weight loss?  Examples include: Semaglutide/Ozempic/Wegovy, Liraglutide/Victoza/Saxenda, Tirzepatide/Zepbound    These injectables will need to be stopped one week prior to surgery.  If you are taking it for diabetes, you can not have any carbohydrates the week after stopping the medication.  Ensure you are eating only protein sources.  Your provider will review your medications to determine if you need to continue it after surgery.      Hypertension:  Are you monitoring your blood pressure at home, and what has it been?    If your blood pressure has not been well-controlled with a systolic pressure lower than  160 and a diastolic pressure lower than 100, you need to contact your family physician for an appointment.    Steroids (oral and/or injectable):  Have you recently taken any oral steroids such as Prednisone or a Medrol dose pack for a respiratory infection or COVID?    Have you had any steroid injections such as a cortisone injection in your back, knee, foot, or any other part of your body?    You can not have any oral steroids and/or steroid injections 45 days prior to surgery.      NSAIDS:  Have you recently taken any NSAIDS such as Mobic, Aleve, Naprosyn, Motrin, BC or Goody's powder?    NSAIDS must be stopped 14 days prior to surgery.  If you are currently taking aspirin or Plavix that a physician has prescribed, continue to take it as prescribed, and a provider in our office will advise you during your pre-op

## 2024-12-06 ENCOUNTER — HOSPITAL ENCOUNTER (OUTPATIENT)
Facility: HOSPITAL | Age: 39
Setting detail: SPECIMEN
Discharge: HOME | End: 2024-12-09

## 2024-12-06 ENCOUNTER — HOSPITAL ENCOUNTER (OUTPATIENT)
Facility: HOSPITAL | Age: 39
Discharge: HOME | End: 2024-12-09

## 2024-12-06 LAB — LABCORP SPECIMEN COLLECTION: NORMAL

## 2024-12-06 PROCEDURE — 99001 SPECIMEN HANDLING PT-LAB: CPT

## 2024-12-07 LAB
ALBUMIN SERPL-MCNC: 4.4 G/DL (ref 3.9–4.9)
ALP SERPL-CCNC: 68 IU/L (ref 44–121)
ALT SERPL-CCNC: 17 IU/L (ref 0–32)
AST SERPL-CCNC: 19 IU/L (ref 0–40)
B-HCG SERPL QL: NEGATIVE MIU/ML
BASOPHILS # BLD AUTO: 0 X10E3/UL (ref 0–0.2)
BASOPHILS NFR BLD AUTO: 0 %
BILIRUB SERPL-MCNC: 0.3 MG/DL (ref 0–1.2)
BUN SERPL-MCNC: 14 MG/DL (ref 6–20)
BUN/CREAT SERPL: 16 (ref 9–23)
CALCIUM SERPL-MCNC: 10.1 MG/DL (ref 8.7–10.2)
CHLORIDE SERPL-SCNC: 103 MMOL/L (ref 96–106)
CO2 SERPL-SCNC: 22 MMOL/L (ref 20–29)
CREAT SERPL-MCNC: 0.89 MG/DL (ref 0.57–1)
EGFRCR SERPLBLD CKD-EPI 2021: 85 ML/MIN/1.73
EOSINOPHIL # BLD AUTO: 0.1 X10E3/UL (ref 0–0.4)
EOSINOPHIL NFR BLD AUTO: 1 %
ERYTHROCYTE [DISTWIDTH] IN BLOOD BY AUTOMATED COUNT: 12 % (ref 11.7–15.4)
GLOBULIN SER CALC-MCNC: 3.5 G/DL (ref 1.5–4.5)
GLUCOSE SERPL-MCNC: 85 MG/DL (ref 70–99)
HCT VFR BLD AUTO: 40.9 % (ref 34–46.6)
HGB BLD-MCNC: 13.2 G/DL (ref 11.1–15.9)
IMM GRANULOCYTES # BLD AUTO: 0 X10E3/UL (ref 0–0.1)
IMM GRANULOCYTES NFR BLD AUTO: 0 %
LYMPHOCYTES # BLD AUTO: 3 X10E3/UL (ref 0.7–3.1)
LYMPHOCYTES NFR BLD AUTO: 33 %
MCH RBC QN AUTO: 28.9 PG (ref 26.6–33)
MCHC RBC AUTO-ENTMCNC: 32.3 G/DL (ref 31.5–35.7)
MCV RBC AUTO: 90 FL (ref 79–97)
MONOCYTES # BLD AUTO: 0.5 X10E3/UL (ref 0.1–0.9)
MONOCYTES NFR BLD AUTO: 6 %
NEUTROPHILS # BLD AUTO: 5.6 X10E3/UL (ref 1.4–7)
NEUTROPHILS NFR BLD AUTO: 60 %
PLATELET # BLD AUTO: 236 X10E3/UL (ref 150–450)
POTASSIUM SERPL-SCNC: 4.8 MMOL/L (ref 3.5–5.2)
PROT SERPL-MCNC: 7.9 G/DL (ref 6–8.5)
RBC # BLD AUTO: 4.57 X10E6/UL (ref 3.77–5.28)
SODIUM SERPL-SCNC: 142 MMOL/L (ref 134–144)
WBC # BLD AUTO: 9.2 X10E3/UL (ref 3.4–10.8)

## 2024-12-09 ENCOUNTER — HOSPITAL ENCOUNTER (OUTPATIENT)
Facility: HOSPITAL | Age: 39
Discharge: HOME | End: 2024-12-12
Payer: MEDICAID

## 2024-12-09 DIAGNOSIS — E66.01 MORBID OBESITY WITH BODY MASS INDEX (BMI) OF 50.0 TO 59.9 IN ADULT: ICD-10-CM

## 2024-12-09 DIAGNOSIS — Z01.812 PRE-OPERATIVE LABORATORY EXAMINATION: ICD-10-CM

## 2024-12-09 DIAGNOSIS — E66.01 MORBID OBESITY: ICD-10-CM

## 2024-12-09 DIAGNOSIS — I10 HYPERTENSION, UNSPECIFIED TYPE: ICD-10-CM

## 2024-12-09 LAB
EKG ATRIAL RATE: 65 BPM
EKG DIAGNOSIS: NORMAL
EKG P AXIS: 57 DEGREES
EKG P-R INTERVAL: 122 MS
EKG Q-T INTERVAL: 432 MS
EKG QRS DURATION: 94 MS
EKG QTC CALCULATION (BAZETT): 449 MS
EKG R AXIS: 48 DEGREES
EKG T AXIS: 27 DEGREES
EKG VENTRICULAR RATE: 65 BPM

## 2024-12-09 PROCEDURE — 93010 ELECTROCARDIOGRAM REPORT: CPT | Performed by: INTERNAL MEDICINE

## 2024-12-09 PROCEDURE — 93005 ELECTROCARDIOGRAM TRACING: CPT

## 2024-12-09 NOTE — PROGRESS NOTES
CLINICAL NUTRITION PRE-OPERATIVE EDUCATION    Patient's Name: Lily Ortiz Age: 39 y.o.   YOB: 1985 Sex: female       Education & Materials Provided - Post-op Binder to include:  Liquid Diet Shopping List   Supplemental Resource Guide: MVI, Vitamin B12, Calcium Citrate, Vitamin D, Vitamin B50, and Iron recommendations  Protein Supplement Information   Fluid Requirements/No Straws  No Caffeine or Carbonation   No Alcohol                               No Snacks or No Concentrated Sweets                                   Exercise Guidelines   Key Diet Principles                            Addressed Current Habits/Changes to Make   Patient was instructed on clear liquid diet guidelines to follow for one (1) day prior to surgery.  Patient has been educated on the liquid diet to begin day 2 post-op and continue for 2 weeks  Patient understands the timeline for diet progression and the need to remain on full liquid diet until advanced by provider and dietitian.               Summary:  Patient has completed the required visits with the Registered Dietitian.  During these nutrition visits, we focused on dietary changes, behavior modifications, and the importance of establishing an exercise routine.  The pre-op diet protocol that patient was prescribed emphasized low carbohydrate intake (less than 50 grams per day) and 60-80 grams (g) of protein per day.     At today's session, patient was educated on the post-op diet and vitamin/mineral protocols.  Patient understands the importance of keeping total fat and sugar intake to less than 3g per serving.  Patient is aware of the the timeline for the different stages of the post-op diet and is aware that they will be on a modified full liquid diet for 2 weeks post-op.  Patient understands that the body needs ~60-70 g/d protein.  During the liquid diet phase, patient will need a minimum of 60 g/d protein from supplemental shakes.  Once eating soft protein-rich

## 2024-12-10 ENCOUNTER — CLINICAL DOCUMENTATION (OUTPATIENT)
Age: 39
End: 2024-12-10

## 2024-12-10 RX ORDER — ONDANSETRON 8 MG/1
8 TABLET, ORALLY DISINTEGRATING ORAL EVERY 8 HOURS PRN
Qty: 30 TABLET | Refills: 0 | Status: SHIPPED | OUTPATIENT
Start: 2024-12-10

## 2024-12-10 RX ORDER — ENOXAPARIN SODIUM 100 MG/ML
INJECTION SUBCUTANEOUS
Qty: 28 EACH | Refills: 0 | Status: SHIPPED | OUTPATIENT
Start: 2024-12-18 | End: 2025-01-01

## 2024-12-10 NOTE — PROGRESS NOTES
Patient attended bariatric surgery pre-operative education class yesterday and viewed the medical pre-op presentation. Patient was given a bariatric binder of resources; form acknowledging receipt of said book was completed.  Lovenox injections and Zofran were sent to patient's pharmacy on file.  VM was left informing patient of medications and to not take them until after surgery.

## 2024-12-13 ENCOUNTER — OFFICE VISIT (OUTPATIENT)
Age: 39
End: 2024-12-13
Payer: MEDICAID

## 2024-12-13 ENCOUNTER — HOSPITAL ENCOUNTER (OUTPATIENT)
Facility: HOSPITAL | Age: 39
Discharge: HOME | End: 2024-12-16
Payer: MEDICAID

## 2024-12-13 VITALS
HEIGHT: 65 IN | DIASTOLIC BLOOD PRESSURE: 73 MMHG | TEMPERATURE: 97.5 F | HEART RATE: 77 BPM | RESPIRATION RATE: 16 BRPM | SYSTOLIC BLOOD PRESSURE: 136 MMHG | OXYGEN SATURATION: 100 % | WEIGHT: 293 LBS | BODY MASS INDEX: 48.82 KG/M2

## 2024-12-13 DIAGNOSIS — M25.50 ARTHRALGIA, UNSPECIFIED JOINT: ICD-10-CM

## 2024-12-13 DIAGNOSIS — E78.00 HYPERCHOLESTEROLEMIA: ICD-10-CM

## 2024-12-13 DIAGNOSIS — E66.01 MORBID OBESITY WITH BODY MASS INDEX (BMI) OF 50.0 TO 59.9 IN ADULT: ICD-10-CM

## 2024-12-13 DIAGNOSIS — E28.2 PCOS (POLYCYSTIC OVARIAN SYNDROME): ICD-10-CM

## 2024-12-13 DIAGNOSIS — I10 HYPERTENSION, UNSPECIFIED TYPE: ICD-10-CM

## 2024-12-13 DIAGNOSIS — G89.18 POST-OP PAIN: Primary | ICD-10-CM

## 2024-12-13 DIAGNOSIS — E66.01 MORBID OBESITY: Primary | ICD-10-CM

## 2024-12-13 DIAGNOSIS — K43.2 INCISIONAL HERNIA, WITHOUT OBSTRUCTION OR GANGRENE: ICD-10-CM

## 2024-12-13 LAB
ABO + RH BLD: NORMAL
BLOOD GROUP ANTIBODIES SERPL: NORMAL
SPECIMEN EXP DATE BLD: NORMAL

## 2024-12-13 PROCEDURE — 86901 BLOOD TYPING SEROLOGIC RH(D): CPT

## 2024-12-13 PROCEDURE — 3078F DIAST BP <80 MM HG: CPT | Performed by: SPECIALIST

## 2024-12-13 PROCEDURE — 86900 BLOOD TYPING SEROLOGIC ABO: CPT

## 2024-12-13 PROCEDURE — 36415 COLL VENOUS BLD VENIPUNCTURE: CPT

## 2024-12-13 PROCEDURE — 99215 OFFICE O/P EST HI 40 MIN: CPT | Performed by: SPECIALIST

## 2024-12-13 PROCEDURE — 86850 RBC ANTIBODY SCREEN: CPT

## 2024-12-13 PROCEDURE — 3075F SYST BP GE 130 - 139MM HG: CPT | Performed by: SPECIALIST

## 2024-12-13 RX ORDER — OXYCODONE AND ACETAMINOPHEN 5; 325 MG/1; MG/1
1 TABLET ORAL EVERY 6 HOURS PRN
Qty: 28 TABLET | Refills: 0 | Status: SHIPPED | OUTPATIENT
Start: 2024-12-13 | End: 2024-12-20

## 2024-12-13 NOTE — H&P (VIEW-ONLY)
0.0 0.0 - 0.1 x10E3/uL   Comprehensive Metabolic Panel    Collection Time: 12/06/24 12:00 AM   Result Value Ref Range    Glucose 85 70 - 99 mg/dL    BUN 14 6 - 20 mg/dL    Creatinine 0.89 0.57 - 1.00 mg/dL    EstHank Filt Rate 85 >59 mL/min/1.73    BUN/Creatinine Ratio 16 9 - 23    Sodium 142 134 - 144 mmol/L    Potassium 4.8 3.5 - 5.2 mmol/L    Chloride 103 96 - 106 mmol/L    CO2 22 20 - 29 mmol/L    Calcium 10.1 8.7 - 10.2 mg/dL    Total Protein 7.9 6.0 - 8.5 g/dL    Albumin 4.4 3.9 - 4.9 g/dL    Globulin, Total 3.5 1.5 - 4.5 g/dL    Total Bilirubin 0.3 0.0 - 1.2 mg/dL    Alkaline Phosphatase 68 44 - 121 IU/L    AST 19 0 - 40 IU/L    ALT 17 0 - 32 IU/L   HCG Qualitative, Serum    Collection Time: 12/06/24 12:00 AM   Result Value Ref Range    Preg, Serum Negative Negative <6 mIU/mL   [facility] Specimen Collection    Collection Time: 12/06/24  4:42 PM   Result Value Ref Range    [facility] Specimen Collection        Specimens collected/sent to [facility]. Please direct inquiries to ([phone]).   EKG 12 Lead    Collection Time: 12/09/24 12:28 PM   Result Value Ref Range    Ventricular Rate 65 BPM    Atrial Rate 65 BPM    P-R Interval 122 ms    QRS Duration 94 ms    Q-T Interval 432 ms    QTc Calculation (Bazett) 449 ms    P Axis 57 degrees    R Axis 48 degrees    T Axis 27 degrees    Diagnosis       Normal sinus rhythm  Minimal voltage criteria for LVH, may be normal variant ( Sokolow-Mccoy )  Borderline ECG  No previous ECGs available  Confirmed by MD Jesus, Jomar ([id number]) on 12/9/2024 4:58:23 PM         Assessment:     Morbid obesity with comorbidity    Plan:     laparoscopic sleeve gastrectomy    This is a 39 y.o. female with a BMI of Body mass index is 59.96 kg/m². and the weight-related co-morbidties as noted above. Lily meets the NIH criteria for bariatric surgery based upon the BMI of Body mass index is 59.96 kg/m². and multiple weight-related co-morbidties. Lily has elected laparoscopic sleeve gastrectomy as  pharmaceutical treatment of her PCOS will be managed by the primary care physician or OB/GYN    Hyperlipidemia - The patient understands that studies show that almost all patient will realize an improvement in their lipid profile with weight loss that occurs with these procedures.   They however also understand that hyperlipidemia is a multifactorial disease particularly as it pertains to their genetic background and that there is no guarantee toward cure of this   issue. We will resume their medications both pre operatively and immediately postoperatively as this tends to decrease any post operative cardiac events.  The patient will follow up   with their family physician in the postoperative period with plans to repeat their lipid panel 2-3 month postoperative for potential adjustment or removal of these medications.    Signed By: Yang Dudley MD     December 13, 2024

## 2024-12-13 NOTE — PROGRESS NOTES
enoxaparin (LOVENOX) 40 MG/0.4ML, Inject 0.4 milliliters subcutaneously every 12 hours (Patient not taking: Reported on 12/13/2024), Disp: 28 each, Rfl: 0    ondansetron (ZOFRAN-ODT) 8 MG TBDP disintegrating tablet, Take 1 tablet by mouth every 8 hours as needed for Nausea or Vomiting (Patient not taking: Reported on 12/13/2024), Disp: 30 tablet, Rfl: 0    FLUoxetine (PROZAC) 20 MG capsule, Take 1 capsule by mouth daily, Disp: , Rfl:     drospirenone-ethinyl estradiol (SABINO) 3-0.02 MG per tablet, Take 1 tablet by mouth daily (Patient not taking: Reported on 10/4/2024), Disp: , Rfl:   Patient has no known allergies.     Review of Systems:     General - No history or complaints of unexpected fever, chills, or weight loss  Head/Neck - No history or complaints of headache, diplopia, dysphagia, hearing loss  Cardiac - No history or complaints of chest pain, palpitations, murmur, or shortness of breath  Pulmonary - No history or complaints of shortness of breath, productive cough, hemoptysis  Gastrointestinal - No history or complaints of reflux,  abdominal pain, obstipation/constipation, blood per rectum  Genitourinary - No history or complaints of hematuria/dysuria, stress urinary incontinence symptoms, or renal lithiasis  Musculoskeletal - No history or complaints of joint pain or muscular weakness  Hematologic - No history or complaints of bleeding disorders, blood transfusions, sickle cell anemia  Neurologic - No history or complaints of  migraine headaches, seizure activity, syncopal episodes, TIA or stroke  Integumentary - No history or complaints of rashes, abnormal nevi, skin cancer  Gynecological - unremarkable     Objective:     /73 (Site: Right Upper Arm, Position: Sitting, Cuff Size: Large Adult)   Pulse 77   Temp 97.5 °F (36.4 °C)   Ht 1.6 m (5' 3\")   Wt (!) 153.5 kg (338 lb 8 oz)   SpO2 100%   BMI 59.96 kg/m²     Physical Examination: General appearance - alert, well appearing, and in no 
post operative DVT / pulmonary embolus secondary to their morbid obese status, relative sedentary lifestyle, and impending general anesthetic.  We will plan to use anticoagulation therapy pre and post operative as well as  pneumatic compression devices and encourage ambulation once on the hospital nursing floor.  The need for possible at home anticoagulation therapy has also been discussed and any decision on this matter will be made during post operative evaluations. The patient understands that their efforts at ambulation are of vital importance to reduce the risk of this complication thus placing significant burden on them as to the prevention of such issues. Signs and symptoms of DVT / PE have been discussed with the patient and they have been instructed to call the office if any these occur in the \"at home\" post op phase      Hypertension - the patient understands, and we have discussed in detail, that this is an active acute health problem that has a multifactorial effect on their body from the standpoint of healing and complications related to surgery.  They understand that uncontrolled hypertension affects other organ processes and this leads to risk associated with surgical procedures.  They understand that in addition to hypertension, once they develop other health issues, their risk is exponentially worse.  Currently they understand that this likely one of the single most important items that they need to control in the perioperative process.  They understand that the hospitals protocol is that patients entering the operative suite should have a blood pressure reading less than 160/90 prior to surgery.  Elevated readings today in office above 160/90 are recommended emergent PCP follow-up or if symptomatic to proceed to the ED. The patient has a clear understanding of how weight loss improves hypertension as a whole, but also they understand that there is a significant genetic component to this disease

## 2024-12-16 ENCOUNTER — ANESTHESIA EVENT (OUTPATIENT)
Facility: HOSPITAL | Age: 39
End: 2024-12-16
Payer: MEDICAID

## 2024-12-19 ENCOUNTER — ANESTHESIA (OUTPATIENT)
Facility: HOSPITAL | Age: 39
End: 2024-12-19
Payer: MEDICAID

## 2024-12-19 ENCOUNTER — HOSPITAL ENCOUNTER (OUTPATIENT)
Facility: HOSPITAL | Age: 39
Setting detail: OBSERVATION
Discharge: HOME | End: 2024-12-20
Attending: SPECIALIST | Admitting: SPECIALIST
Payer: MEDICAID

## 2024-12-19 PROBLEM — E66.01 MORBID OBESITY WITH BMI OF 50.0-59.9, ADULT: Status: ACTIVE | Noted: 2024-12-19

## 2024-12-19 PROBLEM — K76.0 NAFLD (NONALCOHOLIC FATTY LIVER DISEASE): Status: ACTIVE | Noted: 2024-12-19

## 2024-12-19 PROBLEM — K66.0 PERITONEAL ADHESION: Status: ACTIVE | Noted: 2024-12-19

## 2024-12-19 PROBLEM — K29.30 SUPERFICIAL GASTRITIS WITHOUT HEMORRHAGE: Status: ACTIVE | Noted: 2024-12-19

## 2024-12-19 LAB — HCG UR QL: NEGATIVE

## 2024-12-19 PROCEDURE — 2500000003 HC RX 250 WO HCPCS: Performed by: SPECIALIST

## 2024-12-19 PROCEDURE — 2720000010 HC SURG SUPPLY STERILE: Performed by: SPECIALIST

## 2024-12-19 PROCEDURE — 2500000003 HC RX 250 WO HCPCS: Performed by: NURSE ANESTHETIST, CERTIFIED REGISTERED

## 2024-12-19 PROCEDURE — 7100000000 HC PACU RECOVERY - FIRST 15 MIN: Performed by: SPECIALIST

## 2024-12-19 PROCEDURE — 2580000003 HC RX 258: Performed by: NURSE ANESTHETIST, CERTIFIED REGISTERED

## 2024-12-19 PROCEDURE — 6360000002 HC RX W HCPCS: Performed by: SPECIALIST

## 2024-12-19 PROCEDURE — 6370000000 HC RX 637 (ALT 250 FOR IP): Performed by: SPECIALIST

## 2024-12-19 PROCEDURE — G0378 HOSPITAL OBSERVATION PER HR: HCPCS

## 2024-12-19 PROCEDURE — 3700000001 HC ADD 15 MINUTES (ANESTHESIA): Performed by: SPECIALIST

## 2024-12-19 PROCEDURE — C1713 ANCHOR/SCREW BN/BN,TIS/BN: HCPCS | Performed by: SPECIALIST

## 2024-12-19 PROCEDURE — 6360000002 HC RX W HCPCS: Performed by: NURSE ANESTHETIST, CERTIFIED REGISTERED

## 2024-12-19 PROCEDURE — 2709999900 HC NON-CHARGEABLE SUPPLY: Performed by: SPECIALIST

## 2024-12-19 PROCEDURE — 3600000005 HC SURGERY LEVEL 5 BASE: Performed by: SPECIALIST

## 2024-12-19 PROCEDURE — 2580000003 HC RX 258: Performed by: SPECIALIST

## 2024-12-19 PROCEDURE — 81025 URINE PREGNANCY TEST: CPT

## 2024-12-19 PROCEDURE — 7100000001 HC PACU RECOVERY - ADDTL 15 MIN: Performed by: SPECIALIST

## 2024-12-19 PROCEDURE — 3600000015 HC SURGERY LEVEL 5 ADDTL 15MIN: Performed by: SPECIALIST

## 2024-12-19 PROCEDURE — C1781 MESH (IMPLANTABLE): HCPCS | Performed by: SPECIALIST

## 2024-12-19 PROCEDURE — 3700000000 HC ANESTHESIA ATTENDED CARE: Performed by: SPECIALIST

## 2024-12-19 RX ORDER — SODIUM CHLORIDE 9 MG/ML
INJECTION, SOLUTION INTRAVENOUS CONTINUOUS
Status: DISCONTINUED | OUTPATIENT
Start: 2024-12-19 | End: 2024-12-19 | Stop reason: HOSPADM

## 2024-12-19 RX ORDER — ONDANSETRON 2 MG/ML
INJECTION INTRAMUSCULAR; INTRAVENOUS
Status: DISCONTINUED | OUTPATIENT
Start: 2024-12-19 | End: 2024-12-19 | Stop reason: SDUPTHER

## 2024-12-19 RX ORDER — KETOROLAC TROMETHAMINE 30 MG/ML
30 INJECTION, SOLUTION INTRAMUSCULAR; INTRAVENOUS EVERY 6 HOURS
Status: DISCONTINUED | OUTPATIENT
Start: 2024-12-19 | End: 2024-12-19

## 2024-12-19 RX ORDER — SODIUM CHLORIDE, SODIUM LACTATE, POTASSIUM CHLORIDE, CALCIUM CHLORIDE 600; 310; 30; 20 MG/100ML; MG/100ML; MG/100ML; MG/100ML
INJECTION, SOLUTION INTRAVENOUS
Status: DISCONTINUED | OUTPATIENT
Start: 2024-12-19 | End: 2024-12-19 | Stop reason: SDUPTHER

## 2024-12-19 RX ORDER — SODIUM CHLORIDE 9 MG/ML
INJECTION, SOLUTION INTRAVENOUS PRN
Status: DISCONTINUED | OUTPATIENT
Start: 2024-12-19 | End: 2024-12-19 | Stop reason: HOSPADM

## 2024-12-19 RX ORDER — NYSTATIN 100000 [USP'U]/ML
5 SUSPENSION ORAL ONCE
Status: COMPLETED | OUTPATIENT
Start: 2024-12-19 | End: 2024-12-19

## 2024-12-19 RX ORDER — LABETALOL HYDROCHLORIDE 5 MG/ML
5 INJECTION, SOLUTION INTRAVENOUS
Status: DISCONTINUED | OUTPATIENT
Start: 2024-12-19 | End: 2024-12-19 | Stop reason: HOSPADM

## 2024-12-19 RX ORDER — ONDANSETRON 2 MG/ML
4 INJECTION INTRAMUSCULAR; INTRAVENOUS
Status: DISCONTINUED | OUTPATIENT
Start: 2024-12-19 | End: 2024-12-19 | Stop reason: HOSPADM

## 2024-12-19 RX ORDER — METOCLOPRAMIDE HYDROCHLORIDE 5 MG/ML
INJECTION INTRAMUSCULAR; INTRAVENOUS
Status: DISCONTINUED | OUTPATIENT
Start: 2024-12-19 | End: 2024-12-19 | Stop reason: SDUPTHER

## 2024-12-19 RX ORDER — SODIUM CHLORIDE 9 MG/ML
INJECTION, SOLUTION INTRAVENOUS CONTINUOUS
Status: DISCONTINUED | OUTPATIENT
Start: 2024-12-19 | End: 2024-12-20 | Stop reason: HOSPADM

## 2024-12-19 RX ORDER — MAGNESIUM HYDROXIDE 1200 MG/15ML
LIQUID ORAL CONTINUOUS PRN
Status: COMPLETED | OUTPATIENT
Start: 2024-12-19 | End: 2024-12-19

## 2024-12-19 RX ORDER — APREPITANT 40 MG/1
40 CAPSULE ORAL ONCE
Status: COMPLETED | OUTPATIENT
Start: 2024-12-19 | End: 2024-12-19

## 2024-12-19 RX ORDER — BUPIVACAINE HYDROCHLORIDE 2.5 MG/ML
INJECTION, SOLUTION EPIDURAL; INFILTRATION; INTRACAUDAL PRN
Status: DISCONTINUED | OUTPATIENT
Start: 2024-12-19 | End: 2024-12-19 | Stop reason: ALTCHOICE

## 2024-12-19 RX ORDER — NALOXONE HYDROCHLORIDE 0.4 MG/ML
INJECTION, SOLUTION INTRAMUSCULAR; INTRAVENOUS; SUBCUTANEOUS PRN
Status: DISCONTINUED | OUTPATIENT
Start: 2024-12-19 | End: 2024-12-19 | Stop reason: HOSPADM

## 2024-12-19 RX ORDER — METOCLOPRAMIDE HYDROCHLORIDE 5 MG/ML
10 INJECTION INTRAMUSCULAR; INTRAVENOUS EVERY 6 HOURS
Status: DISCONTINUED | OUTPATIENT
Start: 2024-12-19 | End: 2024-12-20 | Stop reason: HOSPADM

## 2024-12-19 RX ORDER — KETOROLAC TROMETHAMINE 30 MG/ML
INJECTION, SOLUTION INTRAMUSCULAR; INTRAVENOUS
Status: DISCONTINUED | OUTPATIENT
Start: 2024-12-19 | End: 2024-12-19 | Stop reason: SDUPTHER

## 2024-12-19 RX ORDER — FENTANYL CITRATE 50 UG/ML
25 INJECTION, SOLUTION INTRAMUSCULAR; INTRAVENOUS EVERY 5 MIN PRN
Status: DISCONTINUED | OUTPATIENT
Start: 2024-12-19 | End: 2024-12-19 | Stop reason: HOSPADM

## 2024-12-19 RX ORDER — SIMETHICONE 80 MG
80 TABLET,CHEWABLE ORAL EVERY 6 HOURS PRN
Status: DISCONTINUED | OUTPATIENT
Start: 2024-12-19 | End: 2024-12-20 | Stop reason: HOSPADM

## 2024-12-19 RX ORDER — MIDAZOLAM HYDROCHLORIDE 2 MG/2ML
2 INJECTION, SOLUTION INTRAMUSCULAR; INTRAVENOUS
Status: DISCONTINUED | OUTPATIENT
Start: 2024-12-19 | End: 2024-12-19 | Stop reason: HOSPADM

## 2024-12-19 RX ORDER — MIDAZOLAM HYDROCHLORIDE 1 MG/ML
INJECTION, SOLUTION INTRAMUSCULAR; INTRAVENOUS
Status: DISCONTINUED | OUTPATIENT
Start: 2024-12-19 | End: 2024-12-19 | Stop reason: SDUPTHER

## 2024-12-19 RX ORDER — METOCLOPRAMIDE HYDROCHLORIDE 5 MG/ML
10 INJECTION INTRAMUSCULAR; INTRAVENOUS EVERY 6 HOURS
Status: DISCONTINUED | OUTPATIENT
Start: 2024-12-19 | End: 2024-12-19

## 2024-12-19 RX ORDER — SODIUM CHLORIDE, SODIUM LACTATE, POTASSIUM CHLORIDE, CALCIUM CHLORIDE 600; 310; 30; 20 MG/100ML; MG/100ML; MG/100ML; MG/100ML
INJECTION, SOLUTION INTRAVENOUS CONTINUOUS
Status: DISCONTINUED | OUTPATIENT
Start: 2024-12-19 | End: 2024-12-19 | Stop reason: HOSPADM

## 2024-12-19 RX ORDER — ACETAMINOPHEN 325 MG/1
650 TABLET ORAL
Status: DISCONTINUED | OUTPATIENT
Start: 2024-12-19 | End: 2024-12-19 | Stop reason: HOSPADM

## 2024-12-19 RX ORDER — ENOXAPARIN SODIUM 100 MG/ML
40 INJECTION SUBCUTANEOUS EVERY 12 HOURS SCHEDULED
Status: DISCONTINUED | OUTPATIENT
Start: 2024-12-20 | End: 2024-12-20 | Stop reason: HOSPADM

## 2024-12-19 RX ORDER — SODIUM CHLORIDE 0.9 % (FLUSH) 0.9 %
5-40 SYRINGE (ML) INJECTION PRN
Status: DISCONTINUED | OUTPATIENT
Start: 2024-12-19 | End: 2024-12-19 | Stop reason: HOSPADM

## 2024-12-19 RX ORDER — HYDROMORPHONE HYDROCHLORIDE 1 MG/ML
0.5 INJECTION, SOLUTION INTRAMUSCULAR; INTRAVENOUS; SUBCUTANEOUS EVERY 5 MIN PRN
Status: DISCONTINUED | OUTPATIENT
Start: 2024-12-19 | End: 2024-12-19 | Stop reason: HOSPADM

## 2024-12-19 RX ORDER — DIPHENHYDRAMINE HYDROCHLORIDE 50 MG/ML
12.5 INJECTION INTRAMUSCULAR; INTRAVENOUS
Status: DISCONTINUED | OUTPATIENT
Start: 2024-12-19 | End: 2024-12-19 | Stop reason: HOSPADM

## 2024-12-19 RX ORDER — ENOXAPARIN SODIUM 100 MG/ML
40 INJECTION SUBCUTANEOUS ONCE
Status: COMPLETED | OUTPATIENT
Start: 2024-12-19 | End: 2024-12-19

## 2024-12-19 RX ORDER — SCOLOPAMINE TRANSDERMAL SYSTEM 1 MG/1
1 PATCH, EXTENDED RELEASE TRANSDERMAL
Status: DISCONTINUED | OUTPATIENT
Start: 2024-12-19 | End: 2024-12-20 | Stop reason: HOSPADM

## 2024-12-19 RX ORDER — SODIUM CHLORIDE 0.9 % (FLUSH) 0.9 %
5-40 SYRINGE (ML) INJECTION EVERY 12 HOURS SCHEDULED
Status: DISCONTINUED | OUTPATIENT
Start: 2024-12-19 | End: 2024-12-19 | Stop reason: HOSPADM

## 2024-12-19 RX ORDER — ONDANSETRON 2 MG/ML
4 INJECTION INTRAMUSCULAR; INTRAVENOUS EVERY 6 HOURS PRN
Status: DISCONTINUED | OUTPATIENT
Start: 2024-12-19 | End: 2024-12-20 | Stop reason: HOSPADM

## 2024-12-19 RX ORDER — SODIUM CHLORIDE 9 MG/ML
INJECTION, SOLUTION INTRAVENOUS PRN
Status: DISCONTINUED | OUTPATIENT
Start: 2024-12-19 | End: 2024-12-20 | Stop reason: HOSPADM

## 2024-12-19 RX ORDER — SODIUM CHLORIDE 0.9 % (FLUSH) 0.9 %
5-40 SYRINGE (ML) INJECTION EVERY 12 HOURS SCHEDULED
Status: DISCONTINUED | OUTPATIENT
Start: 2024-12-19 | End: 2024-12-20 | Stop reason: HOSPADM

## 2024-12-19 RX ORDER — ACETAMINOPHEN 500 MG
1000 TABLET ORAL ONCE
Status: COMPLETED | OUTPATIENT
Start: 2024-12-19 | End: 2024-12-19

## 2024-12-19 RX ORDER — KETOROLAC TROMETHAMINE 30 MG/ML
30 INJECTION, SOLUTION INTRAMUSCULAR; INTRAVENOUS EVERY 6 HOURS
Status: DISCONTINUED | OUTPATIENT
Start: 2024-12-19 | End: 2024-12-20 | Stop reason: HOSPADM

## 2024-12-19 RX ORDER — ONDANSETRON 4 MG/1
4 TABLET, ORALLY DISINTEGRATING ORAL EVERY 8 HOURS PRN
Status: DISCONTINUED | OUTPATIENT
Start: 2024-12-19 | End: 2024-12-20 | Stop reason: HOSPADM

## 2024-12-19 RX ORDER — OXYCODONE HYDROCHLORIDE 5 MG/1
5 TABLET ORAL
Status: DISCONTINUED | OUTPATIENT
Start: 2024-12-19 | End: 2024-12-19 | Stop reason: HOSPADM

## 2024-12-19 RX ORDER — MORPHINE SULFATE 10 MG/ML
6 INJECTION, SOLUTION INTRAMUSCULAR; INTRAVENOUS
Status: DISCONTINUED | OUTPATIENT
Start: 2024-12-19 | End: 2024-12-20

## 2024-12-19 RX ORDER — SODIUM CHLORIDE 0.9 % (FLUSH) 0.9 %
5-40 SYRINGE (ML) INJECTION PRN
Status: DISCONTINUED | OUTPATIENT
Start: 2024-12-19 | End: 2024-12-20 | Stop reason: HOSPADM

## 2024-12-19 RX ORDER — LIDOCAINE HYDROCHLORIDE 20 MG/ML
INJECTION, SOLUTION EPIDURAL; INFILTRATION; INTRACAUDAL; PERINEURAL
Status: DISCONTINUED | OUTPATIENT
Start: 2024-12-19 | End: 2024-12-19 | Stop reason: SDUPTHER

## 2024-12-19 RX ORDER — PROPOFOL 10 MG/ML
INJECTION, EMULSION INTRAVENOUS
Status: DISCONTINUED | OUTPATIENT
Start: 2024-12-19 | End: 2024-12-19 | Stop reason: SDUPTHER

## 2024-12-19 RX ORDER — ROCURONIUM BROMIDE 10 MG/ML
INJECTION, SOLUTION INTRAVENOUS
Status: DISCONTINUED | OUTPATIENT
Start: 2024-12-19 | End: 2024-12-19 | Stop reason: SDUPTHER

## 2024-12-19 RX ADMIN — MORPHINE SULFATE 6 MG: 10 INJECTION INTRAVENOUS at 22:58

## 2024-12-19 RX ADMIN — APREPITANT 40 MG: 40 CAPSULE ORAL at 13:12

## 2024-12-19 RX ADMIN — SODIUM CHLORIDE: 9 INJECTION, SOLUTION INTRAVENOUS at 17:39

## 2024-12-19 RX ADMIN — SUGAMMADEX 300 MG: 100 INJECTION, SOLUTION INTRAVENOUS at 17:01

## 2024-12-19 RX ADMIN — PROPOFOL 350 MG: 10 INJECTION, EMULSION INTRAVENOUS at 15:38

## 2024-12-19 RX ADMIN — SODIUM CHLORIDE, PRESERVATIVE FREE 20 MG: 5 INJECTION INTRAVENOUS at 14:20

## 2024-12-19 RX ADMIN — SODIUM CHLORIDE: 9 INJECTION, SOLUTION INTRAVENOUS at 19:30

## 2024-12-19 RX ADMIN — METOCLOPRAMIDE HYDROCHLORIDE 10 MG: 5 INJECTION INTRAMUSCULAR; INTRAVENOUS at 22:59

## 2024-12-19 RX ADMIN — ROCURONIUM BROMIDE 70 MG: 10 INJECTION, SOLUTION INTRAVENOUS at 15:38

## 2024-12-19 RX ADMIN — Medication 50 MG: at 15:50

## 2024-12-19 RX ADMIN — MIDAZOLAM 2 MG: 1 INJECTION INTRAMUSCULAR; INTRAVENOUS at 15:33

## 2024-12-19 RX ADMIN — KETOROLAC TROMETHAMINE 30 MG: 30 INJECTION, SOLUTION INTRAMUSCULAR; INTRAVENOUS at 22:59

## 2024-12-19 RX ADMIN — SODIUM CHLORIDE: 9 INJECTION, SOLUTION INTRAVENOUS at 14:20

## 2024-12-19 RX ADMIN — METOCLOPRAMIDE HYDROCHLORIDE 10 MG: 5 INJECTION INTRAMUSCULAR; INTRAVENOUS at 16:56

## 2024-12-19 RX ADMIN — LIDOCAINE HYDROCHLORIDE 100 MG: 20 INJECTION, SOLUTION EPIDURAL; INFILTRATION; INTRACAUDAL; PERINEURAL at 15:38

## 2024-12-19 RX ADMIN — NYSTATIN 500000 UNITS: 100000 SUSPENSION ORAL at 13:12

## 2024-12-19 RX ADMIN — MORPHINE SULFATE 6 MG: 10 INJECTION INTRAVENOUS at 20:41

## 2024-12-19 RX ADMIN — SODIUM CHLORIDE, SODIUM LACTATE, POTASSIUM CHLORIDE, AND CALCIUM CHLORIDE: 600; 310; 30; 20 INJECTION, SOLUTION INTRAVENOUS at 15:38

## 2024-12-19 RX ADMIN — ONDANSETRON 4 MG: 2 INJECTION INTRAMUSCULAR; INTRAVENOUS at 16:56

## 2024-12-19 RX ADMIN — HYDROMORPHONE HYDROCHLORIDE 0.5 MG: 1 INJECTION, SOLUTION INTRAMUSCULAR; INTRAVENOUS; SUBCUTANEOUS at 17:55

## 2024-12-19 RX ADMIN — KETOROLAC TROMETHAMINE 15 MG: 30 INJECTION, SOLUTION INTRAMUSCULAR; INTRAVENOUS at 16:56

## 2024-12-19 RX ADMIN — ACETAMINOPHEN 1000 MG: 500 TABLET ORAL at 13:12

## 2024-12-19 RX ADMIN — HYDROMORPHONE HYDROCHLORIDE 1 MG: 1 INJECTION, SOLUTION INTRAMUSCULAR; INTRAVENOUS; SUBCUTANEOUS at 15:37

## 2024-12-19 RX ADMIN — ENOXAPARIN SODIUM 40 MG: 100 INJECTION SUBCUTANEOUS at 13:13

## 2024-12-19 RX ADMIN — WATER 3000 MG: 1 INJECTION INTRAMUSCULAR; INTRAVENOUS; SUBCUTANEOUS at 15:46

## 2024-12-19 ASSESSMENT — PAIN DESCRIPTION - ORIENTATION
ORIENTATION: MID
ORIENTATION: ANTERIOR

## 2024-12-19 ASSESSMENT — PAIN SCALES - GENERAL
PAINLEVEL_OUTOF10: 7

## 2024-12-19 ASSESSMENT — PAIN DESCRIPTION - FREQUENCY
FREQUENCY: CONTINUOUS
FREQUENCY: CONTINUOUS
FREQUENCY: INTERMITTENT
FREQUENCY: CONTINUOUS
FREQUENCY: CONTINUOUS

## 2024-12-19 ASSESSMENT — PAIN DESCRIPTION - LOCATION
LOCATION: ABDOMEN

## 2024-12-19 ASSESSMENT — PAIN DESCRIPTION - ONSET
ONSET: ON-GOING
ONSET: AWAKENED FROM SLEEP

## 2024-12-19 ASSESSMENT — PAIN DESCRIPTION - PAIN TYPE
TYPE: SURGICAL PAIN

## 2024-12-19 ASSESSMENT — PAIN - FUNCTIONAL ASSESSMENT
PAIN_FUNCTIONAL_ASSESSMENT: ACTIVITIES ARE NOT PREVENTED
PAIN_FUNCTIONAL_ASSESSMENT: ACTIVITIES ARE NOT PREVENTED
PAIN_FUNCTIONAL_ASSESSMENT: 0-10
PAIN_FUNCTIONAL_ASSESSMENT: ACTIVITIES ARE NOT PREVENTED
PAIN_FUNCTIONAL_ASSESSMENT: PREVENTS OR INTERFERES SOME ACTIVE ACTIVITIES AND ADLS
PAIN_FUNCTIONAL_ASSESSMENT: ACTIVITIES ARE NOT PREVENTED

## 2024-12-19 ASSESSMENT — PAIN DESCRIPTION - DESCRIPTORS
DESCRIPTORS: ACHING
DESCRIPTORS: SORE
DESCRIPTORS: ACHING

## 2024-12-19 NOTE — PERIOP NOTE
TRANSFER - IN REPORT:    Verbal report received from OR RN on Lily Ortiz  being received from OR for routine progression of patient care      Report consisted of patient's Situation, Background, Assessment and   Recommendations(SBAR).     Information from the following report(s) Adult Overview, Surgery Report, Intake/Output, and MAR was reviewed with the receiving nurse.    Opportunity for questions and clarification was provided.      Assessment completed upon patient's arrival to unit and care assumed.

## 2024-12-19 NOTE — PERIOP NOTE
TRANSFER - OUT REPORT:    Verbal report given to Treasure GOLDBERG on Lily Ortiz  being transferred to  for routine progression of patient care       Report consisted of patient's Situation, Background, Assessment and   Recommendations(SBAR).     Information from the following report(s) Adult Overview, Surgery Report, Intake/Output, and MAR was reviewed with the receiving nurse.           Lines:   Peripheral IV 12/19/24 Right;Ventral Cephalic (Active)   Site Assessment Clean, dry & intact 12/19/24 1417   Line Status Infusing 12/19/24 1417   Line Care Connections checked and tightened 12/19/24 1417   Phlebitis Assessment No symptoms 12/19/24 1417   Infiltration Assessment 0 12/19/24 1417   Alcohol Cap Used No 12/19/24 1417   Dressing Status New dressing applied;Clean, dry & intact 12/19/24 1417   Dressing Type Transparent 12/19/24 1417   Dressing Intervention New 12/19/24 1417       Peripheral IV 12/19/24 Proximal;Right;Anterior Cephalic (Active)   Site Assessment Clean, dry & intact 12/19/24 1737   Line Status Infusing 12/19/24 1737   Phlebitis Assessment No symptoms 12/19/24 1737   Infiltration Assessment 0 12/19/24 1737   Dressing Status Clean, dry & intact 12/19/24 1737   Dressing Type Transparent 12/19/24 1737        Opportunity for questions and clarification was provided.      Patient transported with:  Registered Nurse

## 2024-12-19 NOTE — PERIOP NOTE
Reviewed PTA medication list with patient/caregiver and patient/caregiver denies any additional medications.     Patient admits to having a responsible adult care for them at home for at least 24 hours after surgery.    Patient encouraged to use gown warming system and informed that using said warming gown to regulate body temperature prior to a procedure has been shown to help reduce the risks of blood clots and infection.    Patient's pharmacy of choice verified and documented in PTA medication section.    Dual skin assessment & fall risk band verification completed with Jeanie ANDERSON RN.         Urine pregnancy results Neg and verified with Karen FERMIN CNA.

## 2024-12-19 NOTE — ANESTHESIA PRE PROCEDURE
PREGTESTUR Negative 12/19/2024    PREGSERUM Negative 12/06/2024        ABGs: No results found for: \"PHART\", \"PO2ART\", \"DRN0WZD\", \"QGI3TKT\", \"BEART\", \"P0KHEPWH\"     Type & Screen (If Applicable):  Lab Results   Component Value Date    ABORH B NEGATIVE 12/13/2024    LABANTI NEG 12/13/2024       Drug/Infectious Status (If Applicable):  No results found for: \"HIV\", \"HEPCAB\"    COVID-19 Screening (If Applicable): No results found for: \"COVID19\"        Anesthesia Evaluation  Patient summary reviewed and Nursing notes reviewed  Airway: Mallampati: II  TM distance: >3 FB   Neck ROM: full  Mouth opening: > = 3 FB   Dental: normal exam         Pulmonary:Negative Pulmonary ROS breath sounds clear to auscultation                             Cardiovascular:    (+) hypertension: no interval change        Rhythm: regular  Rate: normal                    Neuro/Psych:   (+) psychiatric history:            GI/Hepatic/Renal:   (+) morbid obesity          Endo/Other: Negative Endo/Other ROS                    Abdominal:             Vascular:          Other Findings:       Anesthesia Plan      general     ASA 3       Induction: intravenous.      Anesthetic plan and risks discussed with patient.      Plan discussed with CRNA.    Attending anesthesiologist reviewed and agrees with Preprocedure content            JOSE BERMEO MD   12/19/2024

## 2024-12-19 NOTE — INTERVAL H&P NOTE
Update History & Physical    The patient's History and Physical of December 13, 2024 was reviewed with the patient and I examined the patient. There was no change. The surgical site was confirmed by the patient and me.     Plan: The risks, benefits, expected outcome, and alternative to the recommended procedure have been discussed with the patient. Patient understands and wants to proceed with the procedure.     Electronically signed by Yang Dudley MD on 12/19/2024 at 1:08 PM

## 2024-12-19 NOTE — OP NOTE
OPERATIVE REPORT         Patient:Lily Ortiz   : 1985  Medical Record Number:[id number]    Pre-operative Diagnosis:  Morbid obesity [E66.01]  Hypertension [I10]  Body mass index 50.0-59.9, adult [Z68.43]  Post-operative Diagnosis: * No post-op diagnosis entered *  Procedure: Procedure(s):  LAPAROSCOPIC SLEEVE GASTRECTOMY  LYSIS OF ADHESIONS (greater than 35 minutes)  LIVER WEDGE BIOPSY  PARTIAL OVER SEW GASTRIC STAPLE  INTRAOPERATIVE ENDOSCOPY WITH BIOPSY  Location: [facility]  Surgeon: Yang Dudley MD  Assistant:  Balaji Salomon PAC  - (TONI Salomon assisted since there were no qualified interns, residents, or any other qualified house   physicians available to assist with this surgery) he performed retraction of various structures,  assisted in   creation of the gastric sleeve, over sew of the sleeve, and obtained hemostasis along staple lines and skin and fascial closure      Anesthesia: General       Specimens: 1. Gastric Sleeve Resection                       2. Liver Wedge Biopsy                       3. Endoscopy biopsy    EBL: less than 5 cc  Additional Findings: None  Complications: None  Implants: none             STATEMENT OF MEDICAL NECESSITY: The patient is a 39 y.o.-year-old female who has   had a history of obesity. They have failed conservative weight loss measures and    began to consider weight loss surgical options. They chose the   sleeve gastrectomy as a means of surgical weight control. They have undergone   nutritional and psychological teaching at this time period and does wish to proceed   with sleeve gastrectomy.     OPERATIVE PROCEDURE: The patient was brought to the operating room, placed   on the table in supine position at which time general  anesthesia was administered   without any difficulty. The abdomen was then prepped and draped in the   usual sterile fashion. Using a 15 blade, a 1 cm incision was made just to the   left of the umbilicus. The veress

## 2024-12-20 ENCOUNTER — APPOINTMENT (OUTPATIENT)
Facility: HOSPITAL | Age: 39
End: 2024-12-20
Attending: SPECIALIST
Payer: MEDICAID

## 2024-12-20 VITALS
SYSTOLIC BLOOD PRESSURE: 129 MMHG | WEIGHT: 293 LBS | BODY MASS INDEX: 48.82 KG/M2 | HEART RATE: 59 BPM | HEIGHT: 65 IN | OXYGEN SATURATION: 100 % | RESPIRATION RATE: 16 BRPM | TEMPERATURE: 98.1 F | DIASTOLIC BLOOD PRESSURE: 63 MMHG

## 2024-12-20 PROCEDURE — 96372 THER/PROPH/DIAG INJ SC/IM: CPT

## 2024-12-20 PROCEDURE — 96374 THER/PROPH/DIAG INJ IV PUSH: CPT

## 2024-12-20 PROCEDURE — 2500000003 HC RX 250 WO HCPCS: Performed by: SPECIALIST

## 2024-12-20 PROCEDURE — 2580000003 HC RX 258: Performed by: SPECIALIST

## 2024-12-20 PROCEDURE — 96375 TX/PRO/DX INJ NEW DRUG ADDON: CPT

## 2024-12-20 PROCEDURE — 6360000002 HC RX W HCPCS: Performed by: SPECIALIST

## 2024-12-20 PROCEDURE — G0378 HOSPITAL OBSERVATION PER HR: HCPCS

## 2024-12-20 PROCEDURE — 96376 TX/PRO/DX INJ SAME DRUG ADON: CPT

## 2024-12-20 PROCEDURE — 74240 X-RAY XM UPR GI TRC 1CNTRST: CPT

## 2024-12-20 PROCEDURE — 6360000004 HC RX CONTRAST MEDICATION: Performed by: SPECIALIST

## 2024-12-20 PROCEDURE — 6370000000 HC RX 637 (ALT 250 FOR IP): Performed by: SPECIALIST

## 2024-12-20 RX ORDER — OXYCODONE AND ACETAMINOPHEN 5; 325 MG/1; MG/1
1 TABLET ORAL EVERY 4 HOURS PRN
Status: DISCONTINUED | OUTPATIENT
Start: 2024-12-20 | End: 2024-12-20 | Stop reason: HOSPADM

## 2024-12-20 RX ADMIN — ENOXAPARIN SODIUM 40 MG: 100 INJECTION SUBCUTANEOUS at 08:45

## 2024-12-20 RX ADMIN — METOCLOPRAMIDE HYDROCHLORIDE 10 MG: 5 INJECTION INTRAMUSCULAR; INTRAVENOUS at 05:01

## 2024-12-20 RX ADMIN — KETOROLAC TROMETHAMINE 30 MG: 30 INJECTION, SOLUTION INTRAMUSCULAR; INTRAVENOUS at 05:01

## 2024-12-20 RX ADMIN — SIMETHICONE 80 MG: 80 TABLET, CHEWABLE ORAL at 10:46

## 2024-12-20 RX ADMIN — METOCLOPRAMIDE HYDROCHLORIDE 10 MG: 5 INJECTION INTRAMUSCULAR; INTRAVENOUS at 10:45

## 2024-12-20 RX ADMIN — PANTOPRAZOLE SODIUM 40 MG: 40 INJECTION, POWDER, FOR SOLUTION INTRAVENOUS at 02:50

## 2024-12-20 RX ADMIN — IOHEXOL 20 ML: 240 INJECTION, SOLUTION INTRATHECAL; INTRAVASCULAR; INTRAVENOUS; ORAL at 07:45

## 2024-12-20 RX ADMIN — KETOROLAC TROMETHAMINE 30 MG: 30 INJECTION, SOLUTION INTRAMUSCULAR; INTRAVENOUS at 10:45

## 2024-12-20 RX ADMIN — BARIUM SULFATE 20 ML: 960 POWDER, FOR SUSPENSION ORAL at 07:45

## 2024-12-20 ASSESSMENT — PAIN DESCRIPTION - FREQUENCY
FREQUENCY: CONTINUOUS
FREQUENCY: CONTINUOUS

## 2024-12-20 ASSESSMENT — PAIN SCALES - GENERAL: PAINLEVEL_OUTOF10: 4

## 2024-12-20 ASSESSMENT — PAIN DESCRIPTION - DESCRIPTORS: DESCRIPTORS: ACHING

## 2024-12-20 ASSESSMENT — PAIN DESCRIPTION - ORIENTATION
ORIENTATION: ANTERIOR
ORIENTATION: ANTERIOR

## 2024-12-20 ASSESSMENT — PAIN - FUNCTIONAL ASSESSMENT
PAIN_FUNCTIONAL_ASSESSMENT: ACTIVITIES ARE NOT PREVENTED
PAIN_FUNCTIONAL_ASSESSMENT: ACTIVITIES ARE NOT PREVENTED

## 2024-12-20 ASSESSMENT — PAIN DESCRIPTION - ONSET: ONSET: ON-GOING

## 2024-12-20 ASSESSMENT — PAIN DESCRIPTION - PAIN TYPE
TYPE: SURGICAL PAIN
TYPE: SURGICAL PAIN

## 2024-12-20 ASSESSMENT — PAIN DESCRIPTION - LOCATION
LOCATION: ABDOMEN
LOCATION: ABDOMEN

## 2024-12-20 NOTE — PLAN OF CARE
Problem: Safety - Adult  Goal: Free from fall injury  12/20/2024 0220 by Agata Peres, RN  Outcome: Progressing  Flowsheets (Taken 12/19/2024 2242)  Free From Fall Injury: Instruct family/caregiver on patient safety  12/19/2024 2108 by Treasure Pratt RN  Outcome: Progressing     Problem: Pain  Goal: Verbalizes/displays adequate comfort level or baseline comfort level  12/20/2024 0220 by Agata Peres, RN  Outcome: Progressing  12/19/2024 2108 by Treasure Pratt RN  Outcome: Progressing     Problem: Chronic Conditions and Co-morbidities  Goal: Patient's chronic conditions and co-morbidity symptoms are monitored and maintained or improved  12/20/2024 0220 by Agata Peres, RN  Outcome: Progressing  12/19/2024 2108 by Treasure Pratt RN  Outcome: Progressing     Problem: Discharge Planning  Goal: Discharge to home or other facility with appropriate resources  12/20/2024 0220 by Agata Peres, RN  Outcome: Progressing  12/19/2024 2108 by Treasure Pratt RN  Outcome: Progressing

## 2024-12-20 NOTE — CARE COORDINATION
Care Management specialist visited patient in room to review and discuss Patients Guide to Observation and Outpatient Care. Patient unable to sign undergoing patient care. Patient verbally acknowledged understanding of notice and observation status. Copy left at patient's bedside and original placed in chart.

## 2024-12-20 NOTE — DISCHARGE INSTRUCTIONS
[facility]  Yang Dudley M.D., F.A.C.S.  [address] [address]  [address]  [place]   [address]  Office: [phone]    Fax:  [phone]    Discharge Instruction for Gastric Bypass / Sleeve Gastrectomy Patients    Diet:  Continue with the liquid diet until you are seen in the office.  Make sure you sip fluids all day.  Aim for  Gms of protein every day.    Activity:  Walking is encouraged.  Rest when you are tired.  You may shower.  You may climb stairs.  Take your time.  No lifting more than 10-15 lbs.  If you feel discomfort during an activity, rest.  Do not drive for 1 week.      Wound Care:  Clean incisions with soap and water when in the shower. Pat dry.  Leave steri-strips on until they fall off.  A small amount of drainage may be present from the incisions.  Contact the office if you notice an increase in drainage, an odor, increased redness, or fever > 100.5.    Medications:  You will receive a prescription for pain medication at the time of discharge.  For upset stomach you may take over the counter medications such as Maalox, Mylanta, Pepcid, Zantac, or Tagamet.  You may take Tylenol  Non-aspirin based arthritis medications may be resumed after the first month.  Take a children’s or adult chewable multivitamin.  Milk of Magnesia will help with constipation.  It is fine to take your usual home medications.  Blood pressure medications should be continued after surgery.  Diabetic medications can frequently be reduced very quickly after surgery.  Diabetics should continue to monitor blood sugars frequently after surgery and contact the prescribing physician for any questions.  Follow-Up Appointment:  If you do not already have a follow-up appointment scheduled, contact the office in the next few days to obtain one.  It is usually scheduled for 10-14 days after you surgery date.  Office phone number:  [phone].        REV  09/2010

## 2024-12-20 NOTE — PROCEDURES
[facility]    Upper GI Procedure Report      Lily Ortiz    Medical Record Number:[id number]    1985    Date of Service - December 20, 2024    Pre-Op Diagnosis - patient is status post sleeve resection performed by myself 24 hours ago. They now present for UGI to assess their post surgical anatomy.    Post-Op Diagnosis -same    Procedure - UGI study with gastrografin    Surgeon - Yang Dudley MD    Assistant - None    Complications - None    Specimens - None    Implants - None    Estimate Blood Loss - None    Statement of Medical Necessity - Need for radiologic evaluation prior to further management of their care..    Procedure -the patient was brought to the fluoroscopy suite where they were given gastrografin.  On swallowing the contrast the patient was noted to have normal peristalsis of their esophagus with progressive flow into the distal esophagus.  Specific findings of the distal esophagus revealed that they did not have a hiatal hernia. Contrast flowed normally through the esophagus and into a properly sized sleeve stomach without reflux or obstruction or signs of stricture. The stomach filled in a timely manner and emptied into the duodenum without issue or hesitation. The anatomy was normal for the timeframe with no stricture or obstruction or any other abnormality.  Given the benign findings of today's exam we will proceed with liquid diet and start PO medications.    Yang Dudley MD

## 2024-12-20 NOTE — PROGRESS NOTES
0720-  Bedside and Verbal shift change report given to ASHLYN Headley (oncoming nurse) by ASHLYN Parmar (offgoing nurse). Report included the following information Nurse Handoff Report, Adult Overview, Surgery Report, Intake/Output, MAR, and Recent Results. Assumed care of pt at this time.     0845-  5mL clear red fluid emptied from NICOL drain. Drain removed, tip intact, no complications. 4x4s and clear dressing applied to site. Hydration education completed, pt verbalized understanding.     1225-  D/c instructions reviewed w/ pt, pt verbalized understanding. Pt will notify staff when her ride arrives.    1300-  IV removed by ASHLYN Ko, pt taken downstairs in wheelchair by transport.

## 2024-12-20 NOTE — PROGRESS NOTES
Bariatric Surgery                POD #1    BP (!) 112/59   Pulse 60   Temp 97.9 °F (36.6 °C) (Oral)   Resp 16   Ht 1.651 m (5' 5\")   Wt (!) 155.5 kg (342 lb 12.8 oz)   SpO2 99%   BMI 57.04 kg/m²   Patient has minimal complaints of pain, minimal nausea noted     Exam:  Appears well in no distress  Lungs- clear bilaterally  Abd - soft, incisions look good without erythema           NICOL with minimal serosanguinous output  Extremities- no new edema or swelling    UGI - no obstrustion or leak    Data Review:    Labs: Results:       Chemistry No results for input(s): \"GLU\", \"NA\", \"K\", \"CL\", \"CO2\", \"BUN\", \"TP\", \"GLOB\" in the last 72 hours.    Invalid input(s): \"CREA\", \"CA\", \"AGAP\", \"BUCR\", \"TBIL\", \"GPT\", \"AP\", \"ALB\", \"AGRAT\"   CBC w/Diff No results for input(s): \"WBC\", \"RBC\", \"HGB\", \"HCT\", \"PLT\", \"RETIC\" in the last 72 hours.    Invalid input(s): \"GRANS\", \"LYMPH\", \"EOS\"   Coagulation No results for input(s): \"INR\", \"APTT\" in the last 72 hours.    Invalid input(s): \"PTP\"    Liver Enzymes No results for input(s): \"TP\" in the last 72 hours.    Invalid input(s): \"ALB\", \"TBIL\", \"AP\", \"SGOT\", \"GPT\", \"DBIL\"       Assessment/Plan: S/P   laparoscopic sleeve gastrectomy - doing well without any issues    1.Start bariatric diet and protein shakes  2.D/C IV pain meds and start PO pain meds  3.D/C NICOL drain  4.Likley PM D/C if  Cont ok and tolerate PO

## 2024-12-20 NOTE — PROGRESS NOTES
20:30 Assessment completed. Lungs are clear bilat IS = 500. Dsg's over trocar sites remain C/D/I with scant amt of brownish colored drainage per NICOL.Voiding per BR in  w/o difficulty. Ambulating in the ramirez with SBA. Steady on her ft.    22:40 Shift assessment completed. See nsg flow sheet for details.     03:05 Reassessed with 0 changes noted. Abd dsg's remain C/D/I.NICOL has brownish drainage. Resting quietly in bed \"I was wondering when do I get to eat?\"Voiding per BR w/o difficulty.    07:20 Bedside and Verbal shift change report given to ADY Dior RN (oncoming nurse) by DORCAS Peres RN (offgoing nurse). Report included the following information Nurse Handoff Report.

## 2024-12-20 NOTE — ANESTHESIA POSTPROCEDURE EVALUATION
Post-Anesthesia Evaluation & Assessment    Visit Vitals  BP (!) 110/51   Pulse 56   Temp 97.9 °F (36.6 °C) (Axillary)   Resp 16   Ht 1.651 m (5' 5\")   Wt (!) 155.5 kg (342 lb 12.8 oz)   SpO2 98%   BMI 57.04 kg/m²       Nausea/Vomiting: no nausea    Post-operative hydration adequate.    Pain score (VAS): 0    Mental status & Level of consciousness: alert and oriented x 3    Neurological status: moves all extremities, sensation grossly intact    Pulmonary status: airway patent, no supplemental oxygen required    Complications related to anesthesia: none    Additional comments:

## 2024-12-20 NOTE — DISCHARGE SUMMARY
Discharge Summary    Patient: Lily Ortiz               Sex: female          DOA: 12/19/2024         YOB: 1985      Age:  39 y.o.        LOS:  LOS: 0 days                Admit Date: 12/19/2024    Discharge Date: 12/20/2024    Admission Diagnoses: Morbid obesity [E66.01]  Hypertension [I10]  Body mass index 50.0-59.9, adult [Z68.43]  Morbid obesity with BMI of 50.0-59.9, adult [E66.01, Z68.43]    Discharge Diagnoses:      Discharge Condition: Good    Hospital Course: The patient underwent sleeve resection  on 12/19/2024. The patient tolerated the procedure well. Vital signs remained stable and the patient was transferred to  3rd floor surgical unit without complications. The patient remained stable throughout the first night post operatively with stable vital signs and adequate urine output and pain control. Pain was controlled with Dilaudid IV and IV Tylenol .  The patient on the first morning post operative was transferred to the radiology suite where they underwent a gastrograffin UGI study which showed no evidence of a leak or stricture. The drain was discontinued on POD # 1 and the patient was started on a bariatric liquid diet with protein shakes. The patient progressed throughout the day and was ambulating well and tolerating their diet. They were therefore discharged home with instructions to notify me with any issues that may arise.    Significant Diagnostic Studies:   No results for input(s): \"HGB\" in the last 72 hours.  No results for input(s): \"HCT\" in the last 72 hours.       Medication List        CONTINUE taking these medications      buPROPion 300 MG extended release tablet  Commonly known as: WELLBUTRIN XL            STOP taking these medications      phentermine 37.5 MG tablet  Commonly known as: ADIPEX-P     spironolactone 50 MG tablet  Commonly known as: ALDACTONE     SABINO 3-0.02 MG per tablet  Generic drug: drospirenone-ethinyl estradiol            ASK your doctor about these  medications      enoxaparin 40 MG/0.4ML  Commonly known as: LOVENOX  Inject 0.4 milliliters subcutaneously every 12 hours     ondansetron 8 MG Tbdp disintegrating tablet  Commonly known as: ZOFRAN-ODT  Take 1 tablet by mouth every 8 hours as needed for Nausea or Vomiting     oxyCODONE-acetaminophen 5-325 MG per tablet  Commonly known as: Percocet  Take 1 tablet by mouth every 6 hours as needed for Pain for up to 7 days. Intended supply: 7 days. Take lowest dose possible to manage pain Max Daily Amount: 4 tablets              Activity: activity as tolerated with no heavy lifting of greater than 20 pounds.     No anti- inflammatory medications. Use stool softeners at home as needed while taking pain medications since they are constipating.    Diet: Bariatric liquid diet    Wound Care: Keep wound clean and dry, Reinforce dressing PRN and ice to area for comfort. Do not get wound wet for 2 days.    Follow-up: 14 days with Dr Octavia Dudley M.D

## 2024-12-20 NOTE — PLAN OF CARE
Problem: Safety - Adult  Goal: Free from fall injury  Outcome: Progressing     Problem: Pain  Goal: Verbalizes/displays adequate comfort level or baseline comfort level  Outcome: Progressing     Problem: Chronic Conditions and Co-morbidities  Goal: Patient's chronic conditions and co-morbidity symptoms are monitored and maintained or improved  Outcome: Progressing     Problem: ABCDS Injury Assessment  Goal: Absence of physical injury  Outcome: Progressing

## 2024-12-23 ENCOUNTER — TELEPHONE (OUTPATIENT)
Age: 39
End: 2024-12-23

## 2024-12-23 NOTE — TELEPHONE ENCOUNTER
This LPN spoke with patient post-operatively.      Hydration: Patient has hydrated with  21 ounces as yesterday. Thus far today, she has hydrated with 16 ounces as of 3:00 pm.     Nausea and/or vomiting: None    Pain: Currently managed with Percocet at night.  Patient is taking Gas X for gassiness; it is not effective.  Patient is using an abdominal binder.    Lovenox injections: Administering every 12 hours, rotating sites.  LPN reminded patient to complete all injections, in which patient verbalized understanding     Lap sites: Remove gauze and assess, No erythema, drainage, and/or swelling     NICOL drain site: No drainage; dressing being changed daily     BM: Daily     Ambulation: Patient is walking throughout house every hour.     IS: Patient continues to use 10x's per hour while awake.  Patient has reached 2,000 mL.  Patient continues to use, but was confused and was only doing it 10x's per day.  LPN re-educated patient on the importance of using it 10x's per hour and reaching 2,000 mL to avoid pneumonia.  Patient verbalized understanding.     Temperature: 98.7 degrees     Medications: (confirmed currently taking)              *Multi-vitamin: yes              *Probiotic: yes              *Prilosec: yes   Questions: None  This LPN reminded the patient to contact the office with any questions and/or concerns.  Patient verbalized understanding.  Patient's two week post-op visit is scheduled and was confirmed.   LPN called pt in an attempt to follow-up post-operatively pt is not home and will call back before the office closes.

## 2024-12-26 ENCOUNTER — TELEPHONE (OUTPATIENT)
Age: 39
End: 2024-12-26

## 2024-12-30 ENCOUNTER — TELEPHONE (OUTPATIENT)
Age: 39
End: 2024-12-30

## 2024-12-30 NOTE — TELEPHONE ENCOUNTER
This LPN spoke with patient post-operatively.      Hydration: Patient has hydrated with 70 ounces as yesterday. Thus far today, she has hydrated with 34 ounces as of 1:00 pm.     Nausea and/or vomiting: None     Pain: Currently managed without medication Patient is taking Gas X for gassiness; it is effective.    using an abdominal binder.    Lovenox injections: Administering every 12 hours, rotating sites.  LPN reminded patient to complete all injections, in which patient verbalized understanding     Lap sites: Remove gauze and assess, No erythema, drainage, and/or swelling     NICOL drain site: No drainage; dressing being changed daily     BM: Daily     Ambulation: Patient is walking throughout house every hour.     IS: Patient continues to use 10x's per hour while awake.  Patient has reached 2,000 mL.   Temperature: 00.0 degrees     Medications: (confirmed currently taking)              *Multi-vitamin: yes              *Probiotic: yes              *Prilosec: yes   Questions: None  This LPN reminded the patient to contact the office with any questions and/or concerns.  Patient verbalized understanding.  Patient's two week post-op visit is scheduled and was confirmed.   LPN left a VM message in an attempt to follow-up post-operatively.  LPN requested a return call.

## 2025-01-02 ENCOUNTER — OFFICE VISIT (OUTPATIENT)
Age: 40
End: 2025-01-02

## 2025-01-02 ENCOUNTER — HOSPITAL ENCOUNTER (OUTPATIENT)
Facility: HOSPITAL | Age: 40
Discharge: HOME | End: 2025-01-05

## 2025-01-02 VITALS
HEART RATE: 70 BPM | WEIGHT: 293 LBS | SYSTOLIC BLOOD PRESSURE: 123 MMHG | BODY MASS INDEX: 48.82 KG/M2 | DIASTOLIC BLOOD PRESSURE: 71 MMHG | OXYGEN SATURATION: 100 % | TEMPERATURE: 98.6 F | HEIGHT: 65 IN

## 2025-01-02 DIAGNOSIS — Z09 POSTOPERATIVE EXAMINATION: Primary | ICD-10-CM

## 2025-01-02 PROCEDURE — 99024 POSTOP FOLLOW-UP VISIT: CPT | Performed by: NURSE PRACTITIONER

## 2025-01-02 RX ORDER — URSODIOL 500 MG/1
500 TABLET, FILM COATED ORAL DAILY
Qty: 30 TABLET | Refills: 4 | Status: SHIPPED | OUTPATIENT
Start: 2025-01-02

## 2025-01-02 RX ORDER — BACILLUS COAGULANS/INULIN 1B-250 MG
CAPSULE ORAL
COMMUNITY

## 2025-01-02 NOTE — PROGRESS NOTES
muscular weakness  Hematologic - No history of any bleeding episodes  Neurologic - No history or complaints of  migraine headaches or neurologic symptoms        Objective:     /71 (Site: Right Upper Arm, Position: Sitting, Cuff Size: Large Adult)   Pulse 70   Temp 98.6 °F (37 °C)   Ht 1.651 m (5' 5\")   Wt (!) 150.2 kg (331 lb 1.6 oz)   SpO2 100%   BMI 55.10 kg/m²     General:  alert, appears stated age, cooperative, and no distress   Chest: lungs clear to auscultation, breath sounds equal and symmetric, no rhonchi, rales or wheezes, no accessory muscle use   Cor:   Regular rate and rhythm, S1S2 present, or without murmur or extra heart sounds   Abdomen: soft, bowel sounds active, non-tender, no masses or organomegaly; large reducible periumbilical hernia   Incisions:   healing well, no drainage, no erythema, no hernia, no seroma, no swelling, no dehiscence, incision well approximated     A:  PORTION OF STOMACH, GASTRIC SLEEVE RESECTION:        SECTIONS OF STOMACH WITHOUT SIGNIFICANT PATHOLOGY.     B:  PREPYLORIC STOMACH, BIOPSY:        GASTRIC MUCOSA WITHOUT SIGNIFICANT PATHOLOGY.   NO HELICOBACTER ORGANISMS SEEN.          C:  LIVER WEDGE, BIOPSY:        LIVER PARENCHYMA WITHOUT SIGNIFICANT PATHOLOGY.   NO SIGNIFICANT FIBROSIS OR STEATOSIS     Assessment:   History of Morbid obesity, status post laparoscopic sleeve gastrectomy.  Doing well postoperatively.    Plan:     Increase activity to the goal of 30 minutes daily  Advance diet to soft pureed phase. Reminded to measure portions, continue high protein, low carbohydrate diet.  Reminded to eat regularly, to eat slowly & not to drink with meals.  Refer to the handbook given in class.  Start ursodiol at 1 month postop for cholelithiasis prevention if not had cholecystectomy. Stop after 6 months out from surgery.  Continue multivitamin   Continue current medications and follow up with PCP for management of regimen.   Encouraged to attend support group   I

## 2025-01-02 NOTE — PROGRESS NOTES
Post-Op Nutrition Note  [facility]      Patient's Name: Lily Ortiz Age: 39 y.o.   YOB: 1985 Sex: female     Reviewed soft/moist puree diet progression for post-op weeks 3-4, directing patient to pg. [id number] of the bariatric surgery education book.  Discussed post-op diet progression to soft/moist pureed phase.  Diet is high-protein, low-fat, and low-carbohydrate.  Reviewed appropriate food choices and portion sizes, maintaining adequate hydration, and  food/fluid intake by following the 30:30 rule.  Additionally we discussed eating behavior modifications, meal adaptations (use of smaller dishes/utensils, eating slowly and chewing sufficiently for digestion, moist cooking techniques, and eating behavior modifications.  Patient was instructed to consume 3 meals and 2-3 protein supplements between meals daily.  Proper meal timing reviewed to include having first meal within 2 hours of waking and eating a meal or snack every 3-4 hrs up until 2 hrs before bedtime.  Reinforced the importance of continuing chewable multivitamin & probiotic, at least 64 oz/d sugar-free/caffeine-free/non-carbonated fluids, and  g/d protein.  Stressed the importance of aiming for at least 150 min/wk of physical activity each day, increasing intensity as tolerated, with restricted lifting, to facilitate desired weight loss.        Pt voiced understanding through repeating the information back to me.  Patient's nutrition-related questions answered.  Reminded pt of their 2 week post-op medical appointment.  Additionally, I reminded pt that I would be calling them again at 1 mo. post-op.      ANA CUEVAS RD

## 2025-01-23 ENCOUNTER — TELEPHONE (OUTPATIENT)
Facility: HOSPITAL | Age: 40
End: 2025-01-23

## 2025-01-23 ENCOUNTER — HOSPITAL ENCOUNTER (OUTPATIENT)
Facility: HOSPITAL | Age: 40
Discharge: HOME | End: 2025-01-26

## 2025-01-23 VITALS — HEIGHT: 63 IN | BODY MASS INDEX: 51.91 KG/M2 | WEIGHT: 293 LBS

## 2025-01-23 NOTE — TELEPHONE ENCOUNTER
1/23/2025:  Contacted patient today at 10:10 AM  in reference to: 1-mo. phone call to assess post-op diet tolerance and discuss diet progression.  Unable to leave a voicemail as mailbox is full.  Patient was left a voicemail instructing them to return call.  Emergency contact, Ivette Partida (mother), contacted via phone and requested she ask patient to contact me at her earliest convenience.  My contact information was provided.    ANA CUEVAS RD

## 2025-01-23 NOTE — PROGRESS NOTES
Probiotic daily    Patient reports struggling with following bariatric lifestyle since returning to work in the past week and is also helping her grandmother, who was recently transitioned to hospice, in the evenings.  Patient is consuming inadequate protein from shakes/foods.  We reviewed the soft/puree diet guidelines in detail, and patient was encouraged to prioritize the following goals:  Aim for 60 g/d protein from supplements, total daily protein goal:  grams  Aim for 3 meals/d soft/puree protein foods, 1 oz portions, 20-30 minutes/meal  Weigh/measure all food/fluid portions and track all intake w/ clover or food journal  Continue drinking 64+ oz/d sugar-free/caffeine-free/non-carbonated fluids  Vitamin regimen:  switch MVI to ProCare 1x/d with 45 mg iron + start B-50/B-100 complex daily; probiotic is optional; hold calcium citrate until PO intake improves  Increase exercise, aiming for 150 min/week cardiovascular exercise and gradually reintroduce strength training, aiming for 20-30 min, 2-3d/wk    Patient verbalizes understanding of plan and all questions were addressed.  I will follow-up in 1 week to reassess patient's PO intake/tolerance. Patient has my contact information for nutrition-related questions/concerns    ANA CUEVAS RD

## 2025-01-29 ENCOUNTER — HOSPITAL ENCOUNTER (OUTPATIENT)
Facility: HOSPITAL | Age: 40
Discharge: HOME | End: 2025-02-01

## 2025-01-29 VITALS — WEIGHT: 293 LBS | HEIGHT: 63 IN | BODY MASS INDEX: 51.91 KG/M2

## 2025-01-29 NOTE — PROGRESS NOTES
Patient is a 39 y.o. yo female approx. 6 weeks S/P laparoscopic sleeve gastrectomy      Patient-Reported Vitals  No data recorded     Pt current weight stated, visit was virtual.     Pre-op Wt: 338 lbs  EBW: 198 lbs    Pt has 20 lbs since surgery on 12/19/24. Pt has lost 10% EBW.    Diet  Pt is currently on a soft/moist puree diet without difficulty.  Patient is hydrating with 70+ ounces, daily.  Patient is tolerating the following sources of protein:  2 Muscle Milk Genuine Zero (25g PRO/svg) protein supplement shakes/day; [employer]ik[employer] Triple Zero Greek yogurt, hard-boiled egg, string cheese, beef/turkey jerky.  Pt is tolerating the following non-starchy vegetables: brussels sprouts, mashed cauliflower.  Pt is consuming 1.5 oz protein/meal (eating 2 meals/d) and unknown amount (few small bites) non-starchy vegetables/meal.  Meals take approximately 20-30 to eat.      Exercise:  riding stationary bike/walking on treadmill,  15-20 min/d, 3d/wk    Supplements:  [x] Bariatric MVI ([place] Complete) 2/d  [] Calcium citrate: 1,500 mg/d, taken in doses of 500 mg TID  [] Vitamin B-50/B-100 Complex daily    Reviewed the following with patient:  Advance diet to solid phase.  Emphasized importance of following guidelines as outlined in surgery handbook for a high-protein, low-carbohydrate/-fat diet (focus on lean proteins and non-starchy vegetables only until at least 6 months or otherwise instructed by your surgeon/dietitian)  Effects of carbohydrates for weight management and prevention of reactive hypoglycemia  Consume 64+ oz/d, caffeine-free, sugar-free, non-carbonated fluids  Eat regularly scheduled meals/snacks (to include protein shakes providing 40-60g/d protein) every 3-4 hours, stopping at least 2 hrs prior to bedtime   Eat slowly, chew thoroughly, and separate food/fluid intake  Weigh/measure all food/fluid portions and record intake in a tracking clover  Make the following changes to vitamin/mineral regimen, may take

## (undated) DEVICE — GARMENT,MEDLINE,DVT,INT,CALF,MED, GEN2: Brand: MEDLINE

## (undated) DEVICE — SUTURE NONABSORBABLE MONOFILAMENT 3-0 PS-1 18 IN BLK ETHILON 1663H

## (undated) DEVICE — STRIP SKIN CLSR W1XL5IN NYLON REINF CURAD STERIL

## (undated) DEVICE — KIT SUTURING DEVICE M-CLOSE

## (undated) DEVICE — SLEEVE TRCR L100MM DIA5MM UNIV STBL FOR BLDELSS DIL TIP

## (undated) DEVICE — SOLUTION SURG PREP 26 CC PURPREP

## (undated) DEVICE — TROCAR LAP L100MM DIA5MM BLDELSS W/ STBL SL ENDOPATH XCEL

## (undated) DEVICE — GLOVE ORANGE PI 7 1/2   MSG9075

## (undated) DEVICE — VISIGI 3D®  CALIBRATION SYSTEM  SIZE 36FR STD W/ BULB: Brand: BOEHRINGER® VISIGI 3D™ SLEEVE GASTRECTOMY CALIBRATION SYSTEM, SIZE 36FR W/BULB

## (undated) DEVICE — TROCAR ENDOSCP BLDELSS 12X100 MM W/ HNDL STBL SL OPT TIP

## (undated) DEVICE — SUTURE ETHIBOND EXCL BR GRN TAPR PT 2-0 30 X563H X563H

## (undated) DEVICE — APPLICATOR LAP 35 CM 2 RIGID VISTASEAL

## (undated) DEVICE — SOLUTION IRRIG 500ML 0.9% SOD CHLO USP POUR PLAS BTL

## (undated) DEVICE — SEALANT TISS 10 CC FOR HUM FIBRIN VISTASEAL

## (undated) DEVICE — TUBING, SUCTION, 1/4" X 12', STRAIGHT: Brand: MEDLINE

## (undated) DEVICE — AGENT HEMSTAT W6XL9IN OXIDIZED REGENERATED CELOS ABSRB FOR

## (undated) DEVICE — BARIATRIC: Brand: MEDLINE INDUSTRIES, INC.

## (undated) DEVICE — STAPLE SKIN LN REINF 60 MM ECHELON ENDOPATH

## (undated) DEVICE — RELOAD STPL H1.8-3.8MM REG THCK TISS G 6 ROW GRIPPING SURF

## (undated) DEVICE — RELOAD STPL L60MM H1.5-3.6MM REG TISS BLU GRIPPING SURF B

## (undated) DEVICE — STAPLER 60MM POWERED ECHELON 3000 LONG 440MM

## (undated) DEVICE — SUTURE MONOCRYL + SZ 4-0 L27IN ABSRB UD L19MM PS-2 3/8 CIR MCP426H

## (undated) DEVICE — TROCAR ENDOSCP L100MM DIA12MM STBL SL BLDELSS ENDOPATH XCEL

## (undated) DEVICE — SET TBNG DISP TIP FOR AHTO

## (undated) DEVICE — SUTURE PDS II SZ 0 L27IN ABSRB VLT L26MM CT-2 1/2 CIR Z334H

## (undated) DEVICE — ELECTRODE PT RET AD L9FT HI MOIST COND ADH HYDRGEL CORDED

## (undated) DEVICE — SHEAR HARMONIC VES SEAL 360 DEG SHFT L 45 MM DIA 5 MM JAW L

## (undated) DEVICE — Device

## (undated) DEVICE — APPLIER CLP L SHFT DIA12MM 20 ROT MULT LIGACLP

## (undated) DEVICE — SOLUTION IV LACTATED RINGERS INJECTION USP

## (undated) DEVICE — RELOAD STPL H4.1X2MM DIA60MM THCK TISS GRN 6 ROW PWR GST B

## (undated) DEVICE — FORCEPS BX OVL CUP SERR DISP CAP L 240CM RAD JAW 4

## (undated) DEVICE — ENDOSCOPY PUMP TUBING/ CAP SET: Brand: ERBE